# Patient Record
Sex: MALE | Race: WHITE
[De-identification: names, ages, dates, MRNs, and addresses within clinical notes are randomized per-mention and may not be internally consistent; named-entity substitution may affect disease eponyms.]

---

## 2018-03-27 ENCOUNTER — APPOINTMENT (OUTPATIENT)
Dept: NEUROLOGY | Facility: CLINIC | Age: 58
End: 2018-03-27
Payer: COMMERCIAL

## 2018-03-27 VITALS
WEIGHT: 180 LBS | DIASTOLIC BLOOD PRESSURE: 84 MMHG | SYSTOLIC BLOOD PRESSURE: 144 MMHG | HEIGHT: 72 IN | HEART RATE: 70 BPM | BODY MASS INDEX: 24.38 KG/M2
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
## 2018-03-27 PROCEDURE — 99204 OFFICE O/P NEW MOD 45 MIN: CPT

## 2018-11-14 ENCOUNTER — EMERGENCY (EMERGENCY)
Facility: HOSPITAL | Age: 58
LOS: 1 days | Discharge: HOME | End: 2018-11-14
Attending: EMERGENCY MEDICINE | Admitting: EMERGENCY MEDICINE
Payer: COMMERCIAL

## 2018-11-14 VITALS
SYSTOLIC BLOOD PRESSURE: 199 MMHG | WEIGHT: 179.9 LBS | HEIGHT: 72 IN | RESPIRATION RATE: 18 BRPM | HEART RATE: 79 BPM | TEMPERATURE: 97 F | OXYGEN SATURATION: 96 % | DIASTOLIC BLOOD PRESSURE: 97 MMHG

## 2018-11-14 VITALS
HEART RATE: 72 BPM | OXYGEN SATURATION: 99 % | RESPIRATION RATE: 16 BRPM | SYSTOLIC BLOOD PRESSURE: 160 MMHG | DIASTOLIC BLOOD PRESSURE: 90 MMHG

## 2018-11-14 PROBLEM — Z00.00 ENCOUNTER FOR PREVENTIVE HEALTH EXAMINATION: Noted: 2018-11-14

## 2018-11-14 LAB
ALBUMIN SERPL ELPH-MCNC: 3.8 G/DL — SIGNIFICANT CHANGE UP (ref 3.3–5)
ALP SERPL-CCNC: 86 U/L — SIGNIFICANT CHANGE UP (ref 40–120)
ALT FLD-CCNC: 31 U/L DA — SIGNIFICANT CHANGE UP (ref 10–45)
ANION GAP SERPL CALC-SCNC: 5 MMOL/L — SIGNIFICANT CHANGE UP (ref 5–17)
APTT BLD: 31.7 SEC — SIGNIFICANT CHANGE UP (ref 27.5–36.3)
AST SERPL-CCNC: 18 U/L — SIGNIFICANT CHANGE UP (ref 10–40)
BASOPHILS # BLD AUTO: 0.1 K/UL — SIGNIFICANT CHANGE UP (ref 0–0.2)
BASOPHILS NFR BLD AUTO: 0.8 % — SIGNIFICANT CHANGE UP (ref 0–2)
BILIRUB SERPL-MCNC: 0.6 MG/DL — SIGNIFICANT CHANGE UP (ref 0.2–1.2)
BLD GP AB SCN SERPL QL: SIGNIFICANT CHANGE UP
BUN SERPL-MCNC: 13 MG/DL — SIGNIFICANT CHANGE UP (ref 7–23)
CALCIUM SERPL-MCNC: 8.8 MG/DL — SIGNIFICANT CHANGE UP (ref 8.4–10.5)
CHLORIDE SERPL-SCNC: 103 MMOL/L — SIGNIFICANT CHANGE UP (ref 96–108)
CK MB BLD-MCNC: 2.2 % — SIGNIFICANT CHANGE UP (ref 0–3.5)
CK MB CFR SERPL CALC: 2.9 NG/ML — SIGNIFICANT CHANGE UP (ref 0.5–10)
CK SERPL-CCNC: 129 U/L — SIGNIFICANT CHANGE UP (ref 30–200)
CO2 SERPL-SCNC: 31 MMOL/L — SIGNIFICANT CHANGE UP (ref 22–31)
CREAT SERPL-MCNC: 0.84 MG/DL — SIGNIFICANT CHANGE UP (ref 0.5–1.3)
EOSINOPHIL # BLD AUTO: 0 K/UL — SIGNIFICANT CHANGE UP (ref 0–0.5)
EOSINOPHIL NFR BLD AUTO: 0.3 % — SIGNIFICANT CHANGE UP (ref 0–6)
GLUCOSE SERPL-MCNC: 99 MG/DL — SIGNIFICANT CHANGE UP (ref 70–99)
HCT VFR BLD CALC: 43 % — SIGNIFICANT CHANGE UP (ref 39–50)
HGB BLD-MCNC: 14.6 G/DL — SIGNIFICANT CHANGE UP (ref 13–17)
INR BLD: 0.97 RATIO — SIGNIFICANT CHANGE UP (ref 0.88–1.16)
LYMPHOCYTES # BLD AUTO: 1.6 K/UL — SIGNIFICANT CHANGE UP (ref 1–3.3)
LYMPHOCYTES # BLD AUTO: 22.8 % — SIGNIFICANT CHANGE UP (ref 13–44)
MCHC RBC-ENTMCNC: 32.1 PG — SIGNIFICANT CHANGE UP (ref 27–34)
MCHC RBC-ENTMCNC: 33.9 GM/DL — SIGNIFICANT CHANGE UP (ref 32–36)
MCV RBC AUTO: 94.9 FL — SIGNIFICANT CHANGE UP (ref 80–100)
MONOCYTES # BLD AUTO: 0.5 K/UL — SIGNIFICANT CHANGE UP (ref 0–0.9)
MONOCYTES NFR BLD AUTO: 7.1 % — SIGNIFICANT CHANGE UP (ref 1–9)
NEUTROPHILS # BLD AUTO: 4.8 K/UL — SIGNIFICANT CHANGE UP (ref 1.8–7.4)
NEUTROPHILS NFR BLD AUTO: 68.9 % — SIGNIFICANT CHANGE UP (ref 43–77)
PLATELET # BLD AUTO: 198 K/UL — SIGNIFICANT CHANGE UP (ref 150–400)
POTASSIUM SERPL-MCNC: 3.8 MMOL/L — SIGNIFICANT CHANGE UP (ref 3.5–5.3)
POTASSIUM SERPL-SCNC: 3.8 MMOL/L — SIGNIFICANT CHANGE UP (ref 3.5–5.3)
PROT SERPL-MCNC: 6.7 G/DL — SIGNIFICANT CHANGE UP (ref 6–8.3)
PROTHROM AB SERPL-ACNC: 10.9 SEC — SIGNIFICANT CHANGE UP (ref 10–12.9)
RBC # BLD: 4.53 M/UL — SIGNIFICANT CHANGE UP (ref 4.2–5.8)
RBC # FLD: 11.8 % — SIGNIFICANT CHANGE UP (ref 10.3–14.5)
SODIUM SERPL-SCNC: 139 MMOL/L — SIGNIFICANT CHANGE UP (ref 135–145)
TROPONIN I SERPL-MCNC: <.017 NG/ML — LOW (ref 0.02–0.06)
WBC # BLD: 6.9 K/UL — SIGNIFICANT CHANGE UP (ref 3.8–10.5)
WBC # FLD AUTO: 6.9 K/UL — SIGNIFICANT CHANGE UP (ref 3.8–10.5)

## 2018-11-14 PROCEDURE — 86850 RBC ANTIBODY SCREEN: CPT

## 2018-11-14 PROCEDURE — 70450 CT HEAD/BRAIN W/O DYE: CPT

## 2018-11-14 PROCEDURE — 80053 COMPREHEN METABOLIC PANEL: CPT

## 2018-11-14 PROCEDURE — 86901 BLOOD TYPING SEROLOGIC RH(D): CPT

## 2018-11-14 PROCEDURE — 70450 CT HEAD/BRAIN W/O DYE: CPT | Mod: 26,59

## 2018-11-14 PROCEDURE — 99284 EMERGENCY DEPT VISIT MOD MDM: CPT | Mod: 25

## 2018-11-14 PROCEDURE — 71045 X-RAY EXAM CHEST 1 VIEW: CPT | Mod: 26

## 2018-11-14 PROCEDURE — 85027 COMPLETE CBC AUTOMATED: CPT

## 2018-11-14 PROCEDURE — 99285 EMERGENCY DEPT VISIT HI MDM: CPT

## 2018-11-14 PROCEDURE — 84484 ASSAY OF TROPONIN QUANT: CPT

## 2018-11-14 PROCEDURE — 82550 ASSAY OF CK (CPK): CPT

## 2018-11-14 PROCEDURE — 86900 BLOOD TYPING SEROLOGIC ABO: CPT

## 2018-11-14 PROCEDURE — 82553 CREATINE MB FRACTION: CPT

## 2018-11-14 PROCEDURE — 93010 ELECTROCARDIOGRAM REPORT: CPT

## 2018-11-14 PROCEDURE — 82962 GLUCOSE BLOOD TEST: CPT

## 2018-11-14 PROCEDURE — 93005 ELECTROCARDIOGRAM TRACING: CPT

## 2018-11-14 PROCEDURE — 70496 CT ANGIOGRAPHY HEAD: CPT

## 2018-11-14 PROCEDURE — 70498 CT ANGIOGRAPHY NECK: CPT | Mod: 26

## 2018-11-14 PROCEDURE — 85610 PROTHROMBIN TIME: CPT

## 2018-11-14 PROCEDURE — 85730 THROMBOPLASTIN TIME PARTIAL: CPT

## 2018-11-14 PROCEDURE — 71045 X-RAY EXAM CHEST 1 VIEW: CPT

## 2018-11-14 PROCEDURE — 70498 CT ANGIOGRAPHY NECK: CPT

## 2018-11-14 PROCEDURE — 70496 CT ANGIOGRAPHY HEAD: CPT | Mod: 26

## 2018-11-14 NOTE — ED PROVIDER NOTE - NSFOLLOWUPINSTRUCTIONS_ED_ALL_ED_FT
1. Follow up with your primary care doctor or Dr. Reyes in 1-2 days  2. Follow up with Ophthalmology in 1-2 days  3. Return to the ED for worsening vision changes, any weakness or numbness in arms or legs, chest pain or shortness of breath or any concerns

## 2018-11-14 NOTE — ED ADULT NURSE NOTE - NSIMPLEMENTINTERV_GEN_ALL_ED
Implemented All Universal Safety Interventions:  [place] to call system. Call bell, personal items and telephone within reach. Instruct patient to call for assistance. Room bathroom lighting operational. Non-slip footwear when patient is off stretcher. Physically safe environment: no spills, clutter or unnecessary equipment. Stretcher in lowest position, wheels locked, appropriate side rails in place.

## 2018-11-14 NOTE — ED PROVIDER NOTE - PROGRESS NOTE DETAILS
left message for Dr. Barber PMD -[id number] D/w Dr. Qui (telestroke neuro) no tPA Dr. Bazan: Pt's sx and HTN improving without intervention, will dc home with ophtho and pmd f/u Dr. Bazan: Pt's sx and HTN improving without intervention, will dc home with ophtho and pmd f/u. Vision is back to normal.

## 2018-11-14 NOTE — ED PROVIDER NOTE - OBJECTIVE STATEMENT
57 y/o M pmh: right labral tear, disc compression lumber spine pw acute onset of bilateral hemianopia and "vice like" headache that began at 11:15am today. Pt has been seen for hypertension in the last few weeks but not treated for it.  Denies SOB, CP, fever, vomiting, weakness, ataxia.

## 2018-11-14 NOTE — ED PROVIDER NOTE - CARE PROVIDER_API CALL
Reyes, John A (MD), Internal Medicine  [address] [facility]  [address]  [place] [address]  Phone: [phone]  Fax: [phone]    José Helm), Ophthalmology  [address] [place] [id number]  Phone: [phone]  Fax: [phone]

## 2018-11-14 NOTE — ED PROVIDER NOTE - CARE PLAN
Principal Discharge DX:	HTN (hypertension) Principal Discharge DX:	HTN (hypertension)  Secondary Diagnosis:	Vision changes

## 2018-11-14 NOTE — ED PROVIDER NOTE - MEDICAL DECISION MAKING DETAILS
59 y/o R/O CVA CT/CTA scan, labs, ekg, chest xray, stroke protocol. Reassess 59 y/o R/O CVA CT/CTA scan, labs, ekg, chest xray, stroke protocol. Reassess. Sx likely secondary to HTN.

## 2018-11-14 NOTE — ED ADULT NURSE NOTE - OBJECTIVE STATEMENT
Patient to ED presents with severe headache and "floaters" within field of vision. Patient hypertensive, states he was told by his PMD that he was hypertensive and he was scheduled for follow up appointment but did not go to appointment after waiting two hours to be seen. Patient strength is equal bilaterally, upper extremity peripheral sensation is intact per my assessment, facial sensation in tact and facial alignment symmetrical. Patient to ED presents with severe headache and "floaters" within field of vision. Patient hypertensive, states he was told by care provider prior to a procedure for his back three weeks ago that he was hypertensive and was recommended for follow up appointment but did not go to appointment after waiting two hours to be seen. Patient strength is equal bilaterally, upper extremity peripheral sensation is intact per my assessment, facial sensation in tact and facial alignment symmetrical.

## 2018-11-15 ENCOUNTER — APPOINTMENT (OUTPATIENT)
Dept: FAMILY MEDICINE | Facility: CLINIC | Age: 58
End: 2018-11-15
Payer: COMMERCIAL

## 2018-11-15 VITALS
SYSTOLIC BLOOD PRESSURE: 138 MMHG | DIASTOLIC BLOOD PRESSURE: 88 MMHG | WEIGHT: 175 LBS | BODY MASS INDEX: 23.7 KG/M2 | HEIGHT: 72 IN

## 2018-11-15 DIAGNOSIS — I10 ESSENTIAL (PRIMARY) HYPERTENSION: ICD-10-CM

## 2018-11-15 DIAGNOSIS — G89.29 LUMBAGO WITH SCIATICA, RIGHT SIDE: ICD-10-CM

## 2018-11-15 DIAGNOSIS — M54.16 RADICULOPATHY, LUMBAR REGION: ICD-10-CM

## 2018-11-15 DIAGNOSIS — Z02.82 ENCOUNTER FOR ADOPTION SERVICES: ICD-10-CM

## 2018-11-15 DIAGNOSIS — M54.41 LUMBAGO WITH SCIATICA, RIGHT SIDE: ICD-10-CM

## 2018-11-15 DIAGNOSIS — Z23 ENCOUNTER FOR IMMUNIZATION: ICD-10-CM

## 2018-11-15 DIAGNOSIS — F41.9 ANXIETY DISORDER, UNSPECIFIED: ICD-10-CM

## 2018-11-15 DIAGNOSIS — F32.9 ANXIETY DISORDER, UNSPECIFIED: ICD-10-CM

## 2018-11-15 PROCEDURE — 99204 OFFICE O/P NEW MOD 45 MIN: CPT | Mod: 25

## 2018-11-15 PROCEDURE — G0008: CPT

## 2018-11-15 PROCEDURE — 90686 IIV4 VACC NO PRSV 0.5 ML IM: CPT

## 2018-11-15 NOTE — HISTORY OF PRESENT ILLNESS
[FreeTextEntry8] : New pt. Went to [facility] ED yesterday due to blurry vision, head aches. Pt. BP was 180/108. Treated in ED but did not give Rx.  Here today to discuss BP.\par PCN allergy.\par Also wants a flu vaccine.

## 2018-11-15 NOTE — HEALTH RISK ASSESSMENT
[No falls in past year] : Patient reported no falls in the past year [0] : 2) Feeling down, depressed, or hopeless: Not at all (0) [Patient reported colonoscopy was normal] : Patient reported colonoscopy was normal [] : No [[id number]] : 0 [ColonoscopyDate] : 01/15

## 2018-11-15 NOTE — PLAN
[FreeTextEntry1] : Strict low sodium diet and increase exercise. Recheck BP in 2-3 weeks and CPE. IF BP still high will start antihypertensive agent.\par Research last Tetanus vaccine as well.

## 2018-11-15 NOTE — COUNSELING
[Healthy eating counseling provided] : healthy eating [Activity counseling provided] : activity [Behavioral health counseling provided] : behavioral health  [Low Fat Diet] : Low fat diet [Low Salt Diet] : Low salt diet [Walking] : Walking [None] : None [Good understanding] : Patient has a good understanding of lifestyle changes and the steps needed to achieve self management goals

## 2018-12-06 ENCOUNTER — APPOINTMENT (OUTPATIENT)
Dept: FAMILY MEDICINE | Facility: CLINIC | Age: 58
End: 2018-12-06

## 2020-11-03 ENCOUNTER — EMERGENCY (EMERGENCY)
Facility: HOSPITAL | Age: 60
LOS: 0 days | Discharge: HOME | End: 2020-11-03
Attending: EMERGENCY MEDICINE
Payer: COMMERCIAL

## 2020-11-03 VITALS
OXYGEN SATURATION: 100 % | SYSTOLIC BLOOD PRESSURE: 159 MMHG | DIASTOLIC BLOOD PRESSURE: 80 MMHG | RESPIRATION RATE: 18 BRPM | HEART RATE: 62 BPM

## 2020-11-03 VITALS
TEMPERATURE: 98 F | DIASTOLIC BLOOD PRESSURE: 92 MMHG | WEIGHT: 169.98 LBS | HEIGHT: 72 IN | SYSTOLIC BLOOD PRESSURE: 169 MMHG | RESPIRATION RATE: 15 BRPM | HEART RATE: 55 BPM | OXYGEN SATURATION: 99 %

## 2020-11-03 DIAGNOSIS — Z79.899 OTHER LONG TERM (CURRENT) DRUG THERAPY: ICD-10-CM

## 2020-11-03 DIAGNOSIS — I10 ESSENTIAL (PRIMARY) HYPERTENSION: ICD-10-CM

## 2020-11-03 DIAGNOSIS — Z88.0 ALLERGY STATUS TO PENICILLIN: ICD-10-CM

## 2020-11-03 DIAGNOSIS — R47.89 OTHER SPEECH DISTURBANCES: ICD-10-CM

## 2020-11-03 DIAGNOSIS — R51.9 HEADACHE, UNSPECIFIED: ICD-10-CM

## 2020-11-03 DIAGNOSIS — F12.90 CANNABIS USE, UNSPECIFIED, UNCOMPLICATED: ICD-10-CM

## 2020-11-03 DIAGNOSIS — R00.1 BRADYCARDIA, UNSPECIFIED: ICD-10-CM

## 2020-11-03 LAB
ALBUMIN SERPL ELPH-MCNC: 4.4 G/DL — SIGNIFICANT CHANGE UP (ref 3.3–5)
ALP SERPL-CCNC: 102 U/L — SIGNIFICANT CHANGE UP (ref 40–120)
ALT FLD-CCNC: 35 U/L — SIGNIFICANT CHANGE UP (ref 12–78)
ANION GAP SERPL CALC-SCNC: 5 MMOL/L — SIGNIFICANT CHANGE UP (ref 5–17)
APTT BLD: 34.9 SEC — SIGNIFICANT CHANGE UP (ref 27.5–35.5)
AST SERPL-CCNC: 23 U/L — SIGNIFICANT CHANGE UP (ref 15–37)
BASOPHILS # BLD AUTO: 0.03 K/UL — SIGNIFICANT CHANGE UP (ref 0–0.2)
BASOPHILS NFR BLD AUTO: 0.5 % — SIGNIFICANT CHANGE UP (ref 0–2)
BILIRUB SERPL-MCNC: 0.5 MG/DL — SIGNIFICANT CHANGE UP (ref 0.2–1.2)
BUN SERPL-MCNC: 14 MG/DL — SIGNIFICANT CHANGE UP (ref 7–23)
CALCIUM SERPL-MCNC: 9.2 MG/DL — SIGNIFICANT CHANGE UP (ref 8.5–10.1)
CHLORIDE SERPL-SCNC: 106 MMOL/L — SIGNIFICANT CHANGE UP (ref 96–108)
CO2 SERPL-SCNC: 28 MMOL/L — SIGNIFICANT CHANGE UP (ref 22–31)
CREAT SERPL-MCNC: 0.89 MG/DL — SIGNIFICANT CHANGE UP (ref 0.5–1.3)
EOSINOPHIL # BLD AUTO: 0.08 K/UL — SIGNIFICANT CHANGE UP (ref 0–0.5)
EOSINOPHIL NFR BLD AUTO: 1.2 % — SIGNIFICANT CHANGE UP (ref 0–6)
GLUCOSE SERPL-MCNC: 81 MG/DL — SIGNIFICANT CHANGE UP (ref 70–99)
HCT VFR BLD CALC: 44.9 % — SIGNIFICANT CHANGE UP (ref 39–50)
HGB BLD-MCNC: 15.1 G/DL — SIGNIFICANT CHANGE UP (ref 13–17)
IMM GRANULOCYTES NFR BLD AUTO: 0.2 % — SIGNIFICANT CHANGE UP (ref 0–1.5)
INR BLD: 0.98 RATIO — SIGNIFICANT CHANGE UP (ref 0.88–1.16)
LYMPHOCYTES # BLD AUTO: 2.14 K/UL — SIGNIFICANT CHANGE UP (ref 1–3.3)
LYMPHOCYTES # BLD AUTO: 32.3 % — SIGNIFICANT CHANGE UP (ref 13–44)
MCHC RBC-ENTMCNC: 31.2 PG — SIGNIFICANT CHANGE UP (ref 27–34)
MCHC RBC-ENTMCNC: 33.6 GM/DL — SIGNIFICANT CHANGE UP (ref 32–36)
MCV RBC AUTO: 92.8 FL — SIGNIFICANT CHANGE UP (ref 80–100)
MONOCYTES # BLD AUTO: 0.53 K/UL — SIGNIFICANT CHANGE UP (ref 0–0.9)
MONOCYTES NFR BLD AUTO: 8 % — SIGNIFICANT CHANGE UP (ref 2–14)
NEUTROPHILS # BLD AUTO: 3.84 K/UL — SIGNIFICANT CHANGE UP (ref 1.8–7.4)
NEUTROPHILS NFR BLD AUTO: 57.8 % — SIGNIFICANT CHANGE UP (ref 43–77)
PLATELET # BLD AUTO: 223 K/UL — SIGNIFICANT CHANGE UP (ref 150–400)
POTASSIUM SERPL-MCNC: 3.9 MMOL/L — SIGNIFICANT CHANGE UP (ref 3.5–5.3)
POTASSIUM SERPL-SCNC: 3.9 MMOL/L — SIGNIFICANT CHANGE UP (ref 3.5–5.3)
PROT SERPL-MCNC: 8 GM/DL — SIGNIFICANT CHANGE UP (ref 6–8.3)
PROTHROM AB SERPL-ACNC: 11.5 SEC — SIGNIFICANT CHANGE UP (ref 10.6–13.6)
RBC # BLD: 4.84 M/UL — SIGNIFICANT CHANGE UP (ref 4.2–5.8)
RBC # FLD: 12.2 % — SIGNIFICANT CHANGE UP (ref 10.3–14.5)
SODIUM SERPL-SCNC: 139 MMOL/L — SIGNIFICANT CHANGE UP (ref 135–145)
TROPONIN I SERPL-MCNC: <0.015 NG/ML — SIGNIFICANT CHANGE UP (ref 0.01–0.04)
WBC # BLD: 6.63 K/UL — SIGNIFICANT CHANGE UP (ref 3.8–10.5)
WBC # FLD AUTO: 6.63 K/UL — SIGNIFICANT CHANGE UP (ref 3.8–10.5)

## 2020-11-03 PROCEDURE — 85610 PROTHROMBIN TIME: CPT

## 2020-11-03 PROCEDURE — 99284 EMERGENCY DEPT VISIT MOD MDM: CPT | Mod: 25

## 2020-11-03 PROCEDURE — 36415 COLL VENOUS BLD VENIPUNCTURE: CPT

## 2020-11-03 PROCEDURE — 80053 COMPREHEN METABOLIC PANEL: CPT

## 2020-11-03 PROCEDURE — 85025 COMPLETE CBC W/AUTO DIFF WBC: CPT

## 2020-11-03 PROCEDURE — 93010 ELECTROCARDIOGRAM REPORT: CPT

## 2020-11-03 PROCEDURE — 85730 THROMBOPLASTIN TIME PARTIAL: CPT

## 2020-11-03 PROCEDURE — 99284 EMERGENCY DEPT VISIT MOD MDM: CPT

## 2020-11-03 PROCEDURE — 84484 ASSAY OF TROPONIN QUANT: CPT

## 2020-11-03 PROCEDURE — 93005 ELECTROCARDIOGRAM TRACING: CPT

## 2020-11-03 PROCEDURE — 70450 CT HEAD/BRAIN W/O DYE: CPT | Mod: 26

## 2020-11-03 PROCEDURE — 70450 CT HEAD/BRAIN W/O DYE: CPT

## 2020-11-03 RX ORDER — ALPRAZOLAM 0.25 MG
0.5 TABLET ORAL ONCE
Refills: 0 | Status: DISCONTINUED | OUTPATIENT
Start: 2020-11-03 | End: 2020-11-03

## 2020-11-03 RX ORDER — METOPROLOL TARTRATE 50 MG
25 TABLET ORAL ONCE
Refills: 0 | Status: COMPLETED | OUTPATIENT
Start: 2020-11-03 | End: 2020-11-03

## 2020-11-03 RX ADMIN — Medication 25 MILLIGRAM(S): at 15:31

## 2020-11-03 RX ADMIN — Medication 0.5 MILLIGRAM(S): at 15:31

## 2020-11-03 NOTE — ED ADULT TRIAGE NOTE - CHIEF COMPLAINT QUOTE
Patient presents in ED with complaints of hypertension. Patient states " I took it at home and it was high and I was concerned". Patient denies headache in triage.

## 2020-11-03 NOTE — ED STATDOCS - CLINICAL SUMMARY MEDICAL DECISION MAKING FREE TEXT BOX
Transient word finding difficulty from 12:30 to 12:45. Now here due to elevated BP. BP currently 160s/90s, baseline 140s/80s. Pt under a lot of stress. NIH 0. Will obtain CT head, screening labs. Symptoms unlikely related to TIA but advise outpatient follow up. Transient word finding difficulty from 12:30 to 12:45. Now here due to elevated BP. BP currently 160s/90s, baseline 140s/80s. Pt under a lot of stress. NIH 0. Will obtain CT head given HA, screening labs. Symptoms unlikely related to TIA but advise outpatient neuro follow up.

## 2020-11-03 NOTE — ED STATDOCS - ATTENDING CONTRIBUTION TO CARE
I, Guzman Randolph MD,  performed the initial face to face bedside interview with this patient regarding history of present illness, review of symptoms and relevant past medical, social and family history.  I completed an independent physical examination.  I was the initial provider who evaluated this patient. I have signed out the follow up of any pending tests (i.e. labs, radiological studies) to the ACP.  The ACP will complete the work up, interpret tests, administer medications if necessary and reassess the patient for an appropriate disposition.  If questions arise the ACP is expected to contact me for consultation. In the current work-flow I usually don't get to speak to nor reassess patients for final disposition once I sign the case out to the ACP (Unless otherwise documented).

## 2020-11-03 NOTE — ED STATDOCS - CARE PLAN
Principal Discharge DX:	Elevated blood pressure reading   Principal Discharge DX:	Elevated blood pressure reading  Secondary Diagnosis:	Headache  Secondary Diagnosis:	Word finding difficulty

## 2020-11-03 NOTE — ED STATDOCS - PATIENT PORTAL LINK FT
You can access the FollowMyHealth Patient Portal offered by [facility] by registering at the following website: [email or web address]. By joining [employer]’s FollowMyHealth portal, you will also be able to view your health information using other applications (apps) compatible with our system.

## 2020-11-03 NOTE — ED STATDOCS - PHYSICAL EXAMINATION
*GEN: No acute distress, well appearing   *HEAD: Normocephalic, Atraumatic  *EYES/NOSE: b/l Pupils symmetric & Reactive to ligth, EOMI b/l  *THROAT: airway patent, moist mucous membranes  *NECK: Neck supple  *PULMONARY: No Respiratory distress, symmetric b/l chest rise  *CARDIAC: s1s2, regular rhythm   *ABDOMEN:  Non Tender, Non Distended, soft, no guarding, no rebound, no masses   *BACK: no CVA tenderness, No midline vertebral tenderness to palpation   *EXTREMITIES: symmetric pulses, 2+ DP & radial pulses, no cyanosis, no edema   *SKIN: no rash, no bruising   *NEUROLOGIC: CN 2-12 intact, normal finger to nose & heel to shin; no dysdiadochokinesis; equal & normal strength & sensation in b/l UE/LE; full active & passive ROM in all extremeties,  no pronator drift, normal patellar reflex, normal gait, romberg sign negative   *PSYCH: appropriate concern about symptoms, pleasant

## 2020-11-03 NOTE — ED STATDOCS - OBJECTIVE STATEMENT
Pertinent HPI/PMH/PSH/FHx/SHx and Review of Systems contained within  HPI: 59 y/o male p/w HTN. 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 Pt reports he took his BP, resulting 177/92. Pt called his cardiologist who told him to repeat his BP, resulting 200/97. +HA, 3/10. Baseline BP 140s/80s. PCP: Dr. Barber. Cardio: Dr. Basurto.  PMH/PSH relevant for: HTN on Metoprolol and Valsartan   ROS negative for: fever, Chest pain, SOB, Nausea, vomiting, diarrhea, abdominal pain, dysuria, weakness, numbness, tingling   SocialHx: Nonsmoker. Occasional Marijuana user.

## 2020-11-03 NOTE — ED STATDOCS - PROGRESS NOTE DETAILS
Patient seen and evaluated with ED attending at intake initially.  Appears anxious, elevated BP, slight HA.  At this time he is feeling better, BP improved.  He is going to follow up with his cardiologist.  Return precautions reviewed -Thang Ward PA-C

## 2020-11-03 NOTE — ED STATDOCS - NS ED ROS FT
Review of Systems:  	•	CONSTITUTIONAL: no fever  	•	SKIN: no rash  	•	RESPIRATORY: no shortness of breath  	•	CARDIAC: no chest pain  	•	GI:  no abd pain, no nausea, no vomiting, no diarrhea  	•	GENITO-URINARY:  no dysuria  	•	MUSCULOSKELETAL:  no back pain  	•	NEUROLOGIC: no weakness. +word finding difficulty, +HA   	•	ALLERGY: no rhinorrhea  	•	PSYSCHIATRIC: appropriate concern about symptoms

## 2021-01-21 ENCOUNTER — EMERGENCY (EMERGENCY)
Facility: HOSPITAL | Age: 61
LOS: 0 days | Discharge: HOME | End: 2021-01-21
Attending: EMERGENCY MEDICINE
Payer: COMMERCIAL

## 2021-01-21 VITALS — HEIGHT: 72 IN | WEIGHT: 169.98 LBS

## 2021-01-21 VITALS
SYSTOLIC BLOOD PRESSURE: 150 MMHG | OXYGEN SATURATION: 96 % | TEMPERATURE: 98 F | RESPIRATION RATE: 16 BRPM | DIASTOLIC BLOOD PRESSURE: 81 MMHG | HEART RATE: 80 BPM

## 2021-01-21 DIAGNOSIS — Z88.0 ALLERGY STATUS TO PENICILLIN: ICD-10-CM

## 2021-01-21 DIAGNOSIS — R10.30 LOWER ABDOMINAL PAIN, UNSPECIFIED: ICD-10-CM

## 2021-01-21 DIAGNOSIS — I10 ESSENTIAL (PRIMARY) HYPERTENSION: ICD-10-CM

## 2021-01-21 DIAGNOSIS — Z79.52 LONG TERM (CURRENT) USE OF SYSTEMIC STEROIDS: ICD-10-CM

## 2021-01-21 DIAGNOSIS — Z79.84 LONG TERM (CURRENT) USE OF ORAL HYPOGLYCEMIC DRUGS: ICD-10-CM

## 2021-01-21 DIAGNOSIS — Z87.19 PERSONAL HISTORY OF OTHER DISEASES OF THE DIGESTIVE SYSTEM: ICD-10-CM

## 2021-01-21 DIAGNOSIS — K59.00 CONSTIPATION, UNSPECIFIED: ICD-10-CM

## 2021-01-21 LAB
ALBUMIN SERPL ELPH-MCNC: 4.1 G/DL — SIGNIFICANT CHANGE UP (ref 3.3–5)
ALP SERPL-CCNC: 85 U/L — SIGNIFICANT CHANGE UP (ref 40–120)
ALT FLD-CCNC: 26 U/L — SIGNIFICANT CHANGE UP (ref 12–78)
ANION GAP SERPL CALC-SCNC: 6 MMOL/L — SIGNIFICANT CHANGE UP (ref 5–17)
APTT BLD: 37.1 SEC — HIGH (ref 27.5–35.5)
AST SERPL-CCNC: 18 U/L — SIGNIFICANT CHANGE UP (ref 15–37)
BASOPHILS # BLD AUTO: 0.04 K/UL — SIGNIFICANT CHANGE UP (ref 0–0.2)
BASOPHILS NFR BLD AUTO: 0.7 % — SIGNIFICANT CHANGE UP (ref 0–2)
BILIRUB SERPL-MCNC: 0.6 MG/DL — SIGNIFICANT CHANGE UP (ref 0.2–1.2)
BUN SERPL-MCNC: 9 MG/DL — SIGNIFICANT CHANGE UP (ref 7–23)
CALCIUM SERPL-MCNC: 9.4 MG/DL — SIGNIFICANT CHANGE UP (ref 8.5–10.1)
CHLORIDE SERPL-SCNC: 107 MMOL/L — SIGNIFICANT CHANGE UP (ref 96–108)
CO2 SERPL-SCNC: 27 MMOL/L — SIGNIFICANT CHANGE UP (ref 22–31)
CREAT SERPL-MCNC: 0.72 MG/DL — SIGNIFICANT CHANGE UP (ref 0.5–1.3)
EOSINOPHIL # BLD AUTO: 0.03 K/UL — SIGNIFICANT CHANGE UP (ref 0–0.5)
EOSINOPHIL NFR BLD AUTO: 0.5 % — SIGNIFICANT CHANGE UP (ref 0–6)
GLUCOSE SERPL-MCNC: 84 MG/DL — SIGNIFICANT CHANGE UP (ref 70–99)
HCT VFR BLD CALC: 40.1 % — SIGNIFICANT CHANGE UP (ref 39–50)
HGB BLD-MCNC: 13.8 G/DL — SIGNIFICANT CHANGE UP (ref 13–17)
IMM GRANULOCYTES NFR BLD AUTO: 0.2 % — SIGNIFICANT CHANGE UP (ref 0–1.5)
INR BLD: 1.09 RATIO — SIGNIFICANT CHANGE UP (ref 0.88–1.16)
LACTATE SERPL-SCNC: 0.9 MMOL/L — SIGNIFICANT CHANGE UP (ref 0.7–2)
LIDOCAIN IGE QN: 91 U/L — SIGNIFICANT CHANGE UP (ref 73–393)
LYMPHOCYTES # BLD AUTO: 1.65 K/UL — SIGNIFICANT CHANGE UP (ref 1–3.3)
LYMPHOCYTES # BLD AUTO: 28.6 % — SIGNIFICANT CHANGE UP (ref 13–44)
MCHC RBC-ENTMCNC: 31.8 PG — SIGNIFICANT CHANGE UP (ref 27–34)
MCHC RBC-ENTMCNC: 34.4 GM/DL — SIGNIFICANT CHANGE UP (ref 32–36)
MCV RBC AUTO: 92.4 FL — SIGNIFICANT CHANGE UP (ref 80–100)
MONOCYTES # BLD AUTO: 0.41 K/UL — SIGNIFICANT CHANGE UP (ref 0–0.9)
MONOCYTES NFR BLD AUTO: 7.1 % — SIGNIFICANT CHANGE UP (ref 2–14)
NEUTROPHILS # BLD AUTO: 3.62 K/UL — SIGNIFICANT CHANGE UP (ref 1.8–7.4)
NEUTROPHILS NFR BLD AUTO: 62.9 % — SIGNIFICANT CHANGE UP (ref 43–77)
PLATELET # BLD AUTO: 230 K/UL — SIGNIFICANT CHANGE UP (ref 150–400)
POTASSIUM SERPL-MCNC: 3.5 MMOL/L — SIGNIFICANT CHANGE UP (ref 3.5–5.3)
POTASSIUM SERPL-SCNC: 3.5 MMOL/L — SIGNIFICANT CHANGE UP (ref 3.5–5.3)
PROT SERPL-MCNC: 7 GM/DL — SIGNIFICANT CHANGE UP (ref 6–8.3)
PROTHROM AB SERPL-ACNC: 12.7 SEC — SIGNIFICANT CHANGE UP (ref 10.6–13.6)
RBC # BLD: 4.34 M/UL — SIGNIFICANT CHANGE UP (ref 4.2–5.8)
RBC # FLD: 12.1 % — SIGNIFICANT CHANGE UP (ref 10.3–14.5)
SARS-COV-2 RNA SPEC QL NAA+PROBE: SIGNIFICANT CHANGE UP
SODIUM SERPL-SCNC: 140 MMOL/L — SIGNIFICANT CHANGE UP (ref 135–145)
WBC # BLD: 5.76 K/UL — SIGNIFICANT CHANGE UP (ref 3.8–10.5)
WBC # FLD AUTO: 5.76 K/UL — SIGNIFICANT CHANGE UP (ref 3.8–10.5)

## 2021-01-21 PROCEDURE — 86900 BLOOD TYPING SEROLOGIC ABO: CPT

## 2021-01-21 PROCEDURE — U0003: CPT

## 2021-01-21 PROCEDURE — 85610 PROTHROMBIN TIME: CPT

## 2021-01-21 PROCEDURE — 83690 ASSAY OF LIPASE: CPT

## 2021-01-21 PROCEDURE — 99284 EMERGENCY DEPT VISIT MOD MDM: CPT

## 2021-01-21 PROCEDURE — 85025 COMPLETE CBC W/AUTO DIFF WBC: CPT

## 2021-01-21 PROCEDURE — 74177 CT ABD & PELVIS W/CONTRAST: CPT | Mod: 26

## 2021-01-21 PROCEDURE — 36415 COLL VENOUS BLD VENIPUNCTURE: CPT

## 2021-01-21 PROCEDURE — 83605 ASSAY OF LACTIC ACID: CPT

## 2021-01-21 PROCEDURE — 86850 RBC ANTIBODY SCREEN: CPT

## 2021-01-21 PROCEDURE — 74177 CT ABD & PELVIS W/CONTRAST: CPT

## 2021-01-21 PROCEDURE — 93005 ELECTROCARDIOGRAM TRACING: CPT

## 2021-01-21 PROCEDURE — U0005: CPT

## 2021-01-21 PROCEDURE — 80053 COMPREHEN METABOLIC PANEL: CPT

## 2021-01-21 PROCEDURE — 85730 THROMBOPLASTIN TIME PARTIAL: CPT

## 2021-01-21 PROCEDURE — 96374 THER/PROPH/DIAG INJ IV PUSH: CPT | Mod: XU

## 2021-01-21 PROCEDURE — 99284 EMERGENCY DEPT VISIT MOD MDM: CPT | Mod: 25

## 2021-01-21 PROCEDURE — 93010 ELECTROCARDIOGRAM REPORT: CPT

## 2021-01-21 PROCEDURE — 86901 BLOOD TYPING SEROLOGIC RH(D): CPT

## 2021-01-21 RX ORDER — SODIUM CHLORIDE 9 MG/ML
1000 INJECTION INTRAMUSCULAR; INTRAVENOUS; SUBCUTANEOUS ONCE
Refills: 0 | Status: COMPLETED | OUTPATIENT
Start: 2021-01-21 | End: 2021-01-21

## 2021-01-21 RX ORDER — ONDANSETRON 8 MG/1
4 TABLET, FILM COATED ORAL ONCE
Refills: 0 | Status: COMPLETED | OUTPATIENT
Start: 2021-01-21 | End: 2021-01-21

## 2021-01-21 RX ADMIN — ONDANSETRON 4 MILLIGRAM(S): 8 TABLET, FILM COATED ORAL at 16:30

## 2021-01-21 RX ADMIN — SODIUM CHLORIDE 1000 MILLILITER(S): 9 INJECTION INTRAMUSCULAR; INTRAVENOUS; SUBCUTANEOUS at 16:30

## 2021-01-21 NOTE — ED PROVIDER NOTE - GASTROINTESTINAL, MLM
Abdomen soft, non-tender, no guarding. Abdomen soft, no guarding. +TTP of abd diffusely, particularly lower abd

## 2021-01-21 NOTE — ED PROVIDER NOTE - PATIENT PORTAL LINK FT
You can access the FollowMyHealth Patient Portal offered by [facility] by registering at the following website: [email or web address]. By joining [employer]’s FollowMyHealth portal, you will also be able to view your health information using other applications (apps) compatible with our system.

## 2021-01-21 NOTE — ED PROVIDER NOTE - CARE PROVIDER_API CALL
Rafi Adams)  Surgery; Surgical Critical Care  [address] [place] [address]  Phone: [phone]  Fax: [phone]  Follow Up Time:

## 2021-01-21 NOTE — ED PROVIDER NOTE - NS_ ATTENDINGSCRIBEDETAILS _ED_A_ED_FT
I John Pham MD saw and examined the patient. Scribe documented for me and under my supervision. I have modified the scribe's documentation where necessary to reflect my history, physical exam and other relevant documentations pertinent to the care of the patient.

## 2021-01-21 NOTE — ED PROVIDER NOTE - MUSCULOSKELETAL, MLM
Spine appears normal, range of motion is not limited, no muscle or joint tenderness Spine appears normal, range of motion is not limited, no muscle or joint tenderness. 5/5 strength of upper and lower extremities, no nuchal rigidity

## 2021-01-21 NOTE — ED ADULT TRIAGE NOTE - CHIEF COMPLAINT QUOTE
sent in by dr. anderson for abdominal pain for past 3 days, pt has past medical history of obstruction, c/o similar symptoms from time of obstruction, sent in for further evaluation

## 2021-01-21 NOTE — ED ADULT NURSE NOTE - OBJECTIVE STATEMENT
pt presents to ed ambulatory for evaluation of abdominal pain/ diarrhea/poor po intake and now constipation x 3 days. pt has hx of prior bowel obstruction and reports feeling is the same. pt a&ox4, vitals stable, afebrile, in no distress, ekg done, no other comlaints

## 2021-01-21 NOTE — ED PROVIDER NOTE - PROGRESS NOTE DETAILS
Amy DAVEY for ED attending, Dr. Pham: Updated pt with results of labs and imaging, CT imaging with no acute pathology. Contacted surgery on call. Pt feels ok, provided pt with labs and imaging, pt to follow up with Dr. Adams.

## 2021-01-21 NOTE — ED PROVIDER NOTE - NSFOLLOWUPCLINICS_GEN_ALL_ED_FT
[facility]  Family Medicine  [address] [place] [address]  Phone: [phone]  Fax:   Follow Up Time:

## 2021-01-21 NOTE — ED ADULT NURSE NOTE - NS ED NOTE ABUSE SUSPICION NEGLECT YN
as per patient, she recalls falling but not hitting the ground  ?syncope form orthostasis  patient with continued positive orthostatics after hydration  possibly venlaxifine induced-- will need to address as outpatient if for d/c tomorrow No

## 2021-01-21 NOTE — ED PROVIDER NOTE - NEUROLOGICAL, MLM
Alert and oriented, no focal deficits, no motor or sensory deficits. Alert and oriented, no focal deficits, no motor or sensory deficits. NIH 0, GCS 15, CN 2-12 intact.

## 2021-01-21 NOTE — ED STATDOCS - NS_ ATTENDINGSCRIBEDETAILS _ED_A_ED_FT
I, Braulio Mcfarland MD,  performed the initial face to face bedside interview with this patient regarding history of present illness, review of symptoms and relevant past medical, social and family history.  I completed an independent physical examination.    The history, relevant review of systems, past medical and surgical history, medical decision making, and physical examination was documented by the scribe in my presence and I attest to the accuracy of the documentation.

## 2021-01-21 NOTE — ED PROVIDER NOTE - OBJECTIVE STATEMENT
61 y/o male with PMHx of HTN, bowel obstruction x4 years ago presents to the ED sent in by PMD Dr. Zepeda c/o lower abd pain x3 days. Pt has diarrhea x2 days ago but now with constipation x12 hours, decreased PO intake. Pt states this feels like prior bowel obstruction. Denies vomiting, fevers. Denies chest pain, palpitations, SOB. Occasional weed smoker, non-drinker, non tobacco smoker. 61 y/o male with PMHx of HTN, bowel obstruction x4 years ago repaired by Dr. Adams presents to the ED sent in by PMD Dr. Zepeda c/o lower abd pain x3 days. Pt had diarrhea x2 days ago but now with constipation x12 hours, decreased PO intake. Pt states this feels like prior bowel obstruction. Denies vomiting, fevers. Denies chest pain, palpitations, SOB. Occasional weed smoker, non-drinker, non tobacco smoker.

## 2021-01-21 NOTE — ED PROVIDER NOTE - EYES, MLM
Clear bilaterally, pupils equal, round and reactive to light. Clear bilaterally, pupils equal, round and reactive to light. Visual fields intact.

## 2021-01-21 NOTE — ED PROVIDER NOTE - NSFOLLOWUPINSTRUCTIONS_ED_ALL_ED_FT
Please follow up with Dr. Adams your general surgeron. Please follow up with Dr. Adams your general surgeon. Please note that you had an CAT scan (CT scan) of your abdomen today which as per radiology report did not show any evidence of acute pathology, no obstruction or other emergent findings. Please note that CAT scan (CT scan) imaging is not a perfect imaging and that you might still have pathology that a CAT scan (CT scan) can miss so if you have any worsening of the symptoms return to us immediately. Please contact Dr. Adams or your primary surgeon as soon as possible. Please continue to hydrate. If you have any inability to eat or drink, worsening of the pain, or if you are unable to see Dr. Adams in the next 4 days return to us immediately. If you have worsening of the symptoms please return to us immediately.    ____________      Abdominal Pain, Adult      Pain in the abdomen (abdominal pain) can be caused by many things. Often, abdominal pain is not serious and it gets better with no treatment or by being treated at home. However, sometimes abdominal pain is serious.    Your health care provider will ask questions about your medical history and do a physical exam to try to determine the cause of your abdominal pain.      Follow these instructions at home:     Medicines     •Take over-the-counter and prescription medicines only as told by your health care provider.      • Do not take a laxative unless told by your health care provider.      General instructions     •Watch your condition for any changes.      •Drink enough fluid to keep your urine pale yellow.      •Keep all follow-up visits as told by your health care provider. This is important.        Contact a health care provider if:    •Your abdominal pain changes or gets worse.      •You are not hungry or you lose weight without trying.      •You are constipated or have diarrhea for more than 2–3 days.      •You have pain when you urinate or have a bowel movement.      •Your abdominal pain wakes you up at night.      •Your pain gets worse with meals, after eating, or with certain foods.      •You are vomiting and cannot keep anything down.      •You have a fever.      •You have blood in your urine.        Get help right away if:    •Your pain does not go away as soon as your health care provider told you to expect.      •You cannot stop vomiting.      •Your pain is only in areas of the abdomen, such as the right side or the left lower portion of the abdomen. Pain on the right side could be caused by appendicitis.      •You have bloody or black stools, or stools that look like tar.      •You have severe pain, cramping, or bloating in your abdomen.    •You have signs of dehydration, such as:  •Dark urine, very little urine, or no urine.      •Cracked lips.      •Dry mouth.      •Sunken eyes.      •Sleepiness.      •Weakness.        •You have trouble breathing or chest pain.        Summary    •Often, abdominal pain is not serious and it gets better with no treatment or by being treated at home. However, sometimes abdominal pain is serious.      •Watch your condition for any changes.      •Take over-the-counter and prescription medicines only as told by your health care provider.      •Contact a health care provider if your abdominal pain changes or gets worse.      •Get help right away if you have severe pain, cramping, or bloating in your abdomen.      This information is not intended to replace advice given to you by your health care provider. Make sure you discuss any questions you have with your health care provider.

## 2021-01-21 NOTE — ED PROVIDER NOTE - CLINICAL SUMMARY MEDICAL DECISION MAKING FREE TEXT BOX
Pt with history of SBO, pt of Dr. Adams, presents with CC of worsening abd pain similar to how he felt prior to previous surgery. Plan for CT, labs. Pt with history of SBO (pt of Dr. Adams) presents with CC of worsening abd pain similar to how he felt prior to previous surgery. Plan for CT, labs.

## 2021-02-13 ENCOUNTER — TRANSCRIPTION ENCOUNTER (OUTPATIENT)
Age: 61
End: 2021-02-13

## 2021-10-13 ENCOUNTER — EMERGENCY (EMERGENCY)
Facility: HOSPITAL | Age: 61
LOS: 0 days | Discharge: HOME | End: 2021-10-13
Attending: EMERGENCY MEDICINE
Payer: COMMERCIAL

## 2021-10-13 VITALS — WEIGHT: 175.05 LBS | HEIGHT: 72 IN

## 2021-10-13 VITALS
OXYGEN SATURATION: 99 % | HEART RATE: 60 BPM | TEMPERATURE: 98 F | SYSTOLIC BLOOD PRESSURE: 145 MMHG | DIASTOLIC BLOOD PRESSURE: 85 MMHG | RESPIRATION RATE: 17 BRPM

## 2021-10-13 DIAGNOSIS — I10 ESSENTIAL (PRIMARY) HYPERTENSION: ICD-10-CM

## 2021-10-13 DIAGNOSIS — H57.89 OTHER SPECIFIED DISORDERS OF EYE AND ADNEXA: ICD-10-CM

## 2021-10-13 DIAGNOSIS — Z87.19 PERSONAL HISTORY OF OTHER DISEASES OF THE DIGESTIVE SYSTEM: ICD-10-CM

## 2021-10-13 DIAGNOSIS — H10.33 UNSPECIFIED ACUTE CONJUNCTIVITIS, BILATERAL: ICD-10-CM

## 2021-10-13 DIAGNOSIS — Z88.0 ALLERGY STATUS TO PENICILLIN: ICD-10-CM

## 2021-10-13 PROCEDURE — 99283 EMERGENCY DEPT VISIT LOW MDM: CPT

## 2021-10-13 RX ORDER — OFLOXACIN 0.3 %
1 DROPS OPHTHALMIC (EYE) ONCE
Refills: 0 | Status: COMPLETED | OUTPATIENT
Start: 2021-10-13 | End: 2021-10-13

## 2021-10-13 RX ADMIN — Medication 1 DROP(S): at 23:34

## 2021-10-13 RX ADMIN — Medication 2 DROP(S): at 23:34

## 2021-10-13 NOTE — ED PROVIDER NOTE - PROGRESS NOTE DETAILS
RASHIDA Hutchison MD:  Pt re-examined after topical Tetracaine gtts o.u.  + Symptomatic alleviation of b/L eye pain, pt able to better maintain eyes open, blinking w/o much discomfort.  Pupils B/L well reactive, B/L =.  No focal Flouro uptake.  Pt agreeable with D/C home on topical eye Abx gtts, outpt Optho f/u.

## 2021-10-13 NOTE — ED PROVIDER NOTE - CONSTITUTIONAL, MLM
normal... WM adult, awake, alert, oriented to person, place, time/situation, no respiratory discomfort.  + Acute distress due to B/L eye pain.

## 2021-10-13 NOTE — ED PROVIDER NOTE - ADDITIONAL NOTES AND INSTRUCTIONS:
Stephen Lombardo is medically excused from work 10/14-15/21.  He can return to work Monday, 10/18/21.

## 2021-10-13 NOTE — ED PROVIDER NOTE - OBJECTIVE STATEMENT
62 yo WM, denies PMH, ambulatory to ED c/o'ing B/L eye pain ~ 2 - 3 hours.  + Gradual onset of sharp pain, progressively worsening, + sensitive to light, blinking, + some assoc. blurred vision.  + Associated redness of eye with D/C.  No known injury/trauma nor any known ill contact.  + Employed as teacher.  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  No F/C.  + Mild HA.   + COVID-vaccinated.  All: PCN 62 yo WM, denies PMH, ambulatory to ED c/o'ing B/L eye pain ~ 2 - 3 hours.  + Gradual onset of sharp pain, progressively worsening, + sensitive to light, blinking, + some assoc. blurred vision.  + Associated redness of eye with D/C.  No known injury/trauma.   + Ill contact at home: daughter with pink eye.  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  + Mild HA.   + COVID-vaccinated.  All: PCN 60 yo WM, denies PMH, ambulatory to ED c/o'ing B/L eye pain ~ 2 - 3 hours.  + Gradual onset of sharp pain, progressively worsening, + sensitive to light, blinking; + some assoc. blurred vision.  + Associated redness of eye with D/C.  No known injury/trauma.   + Ill contact at home: daughter with "pink eye".  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  + Mild HA.   + COVID-vaccinated.  All: PCN

## 2021-10-13 NOTE — ED PROVIDER NOTE - EYES, MLM
+ 3+ conjunctival injection B/L with mild conjunctival D/C.  Pupils mid-range, sluggishly reactive & B/L =.  +EOMI.

## 2021-10-13 NOTE — ED ADULT NURSE NOTE - CHIEF COMPLAINT QUOTE
pt presents to the ED c/o irritation/burning to bilateral eyes x 2 hours with +tears/drainage. Pt states he does not remember getting anything in them. States he put lubricating eyedrops in both eyes which made it worse. Denies blurred vision/visual loss. Pt's daughter is being treated for pink eye.

## 2021-10-13 NOTE — ED PROVIDER NOTE - CLINICAL SUMMARY MEDICAL DECISION MAKING FREE TEXT BOX
60 yo WM, denies PMH, ambulatory to ED c/o'ing B/L eye pain ~ 2 - 3 hours.  + Gradual onset of sharp pain, progressively worsening, + sensitive to light, blinking; + some assoc. blurred vision.  + Associated redness of eye with D/C.  No known injury/trauma.   + Ill contact at home: daughter with "pink eye".   Plan: Tetracine gtts o.u with + symptomactic relief, then Flouro test ruled out corneal abrasion, topical Abx gtts for acute conjunctivitis, r/o bacterial.  D/C, outpt optho f/u.

## 2021-10-13 NOTE — ED PROVIDER NOTE - PATIENT PORTAL LINK FT
You can access the FollowMyHealth Patient Portal offered by [facility] by registering at the following website: [email or web address]. By joining [employer]’s FollowMyHealth portal, you will also be able to view your health information using other applications (apps) compatible with our system.

## 2021-10-13 NOTE — ED PROVIDER NOTE - MUSCULOSKELETAL, MLM
Spine appears normal, range of motion is not limited, no muscle or joint tenderness.  Neck: NT, supple w/o pain.

## 2021-10-13 NOTE — ED PROVIDER NOTE - NSFOLLOWUPINSTRUCTIONS_ED_ALL_ED_FT
Take antibiotic eyedrops as prescribed: 1st 2 days: 1 drop each eye every 2 - 3 hours while awake, thereafter 1 drop 4 - 5 x a day for 7 days total duration.  Follow-up with eye doctor.  Motrin/Advil 200 mg 2 - 3 tabs every 6 hours as needed for pain relief.  Wash your hands after touching eyes.  Do NOT share any towels with anyone else.      Conjunctivitis    WHAT YOU NEED TO KNOW:    Conjunctivitis, or pink eye, is inflammation of your conjunctiva. The conjunctiva is a thin tissue that covers the front of your eye and the back of your eyelids. The conjunctiva helps protect your eye and keep it moist. Conjunctivitis may be caused by bacteria, allergies, or a virus. If your conjunctivitis is caused by bacteria, it may get better on its own in about 7 days. Viral conjunctivitis can last up to 3 weeks.     DISCHARGE INSTRUCTIONS:    Return to the emergency department if:   •You have worsening eye pain.       •The swelling in your eye gets worse, even after treatment.       •Your vision suddenly becomes worse or you cannot see at all.      Contact your healthcare provider if:   •You develop a fever and ear pain.      •You have tiny bumps or spots of blood on your eye.      •You have questions or concerns about your condition or care.      Manage your symptoms:   •Apply a cool compress. Wet a washcloth with cold water and place it on your eye. This will help decrease itching and irritation.      •Do not wear contact lenses. They can irritate your eye. Throw away the pair you are using and ask when you can wear them again. Use a new pair of lenses when your healthcare provider says it is okay.       •Avoid irritants. Stay away from smoke filled areas. Shield your eyes from wind and sun.       •Flush your eye. You may need to flush your eye with saline to help decrease your symptoms. Ask for more information on how to flush your eye.       Medicines: Treatment depends on what is causing your conjunctivitis. You maybe given any of the following:  •Allergy medicine helps decrease itchy, red, swollen eyes caused by allergies. It may be given as a pill, eye drops, or nasal spray.      •Antibiotics may be needed if your conjunctivitis is caused by bacteria. This medicine may be given as a pill, eye drops, or eye ointment.      •Take your medicine as directed. Contact your healthcare provider if you think your medicine is not helping or if you have side effects. Tell him or her if you are allergic to any medicine. Keep a list of the medicines, vitamins, and herbs you take. Include the amounts, and when and why you take them. Bring the list or the pill bottles to follow-up visits. Carry your medicine list with you in case of an emergency.      Prevent the spread of conjunctivitis:   •Wash your hands with soap and water often. Wash your hands before and after you touch your eyes. Also wash your hands before you prepare or eat food and after you use the bathroom or change a diaper.      •Avoid allergens. Try to avoid the things that cause your allergies, such as pets, dust, or grass.       •Avoid contact with others. Do not share towels or washcloths. Try to stay away from others as much as possible. Ask when you can return to work or school.       •Throw away eye makeup. The bacteria that caused your conjunctivitis can stay in eye makeup. Throw away mascara and other eye makeup.

## 2021-10-13 NOTE — ED PROVIDER NOTE - CARE PROVIDER_API CALL
Quinton Goldstein (MD; PhD)  Ophthalmology  [address]  [address]  [place] [address]  Phone: [phone]  Fax: [phone]  Follow Up Time:

## 2021-10-14 PROBLEM — K56.609 UNSPECIFIED INTESTINAL OBSTRUCTION, UNSPECIFIED AS TO PARTIAL VERSUS COMPLETE OBSTRUCTION: Chronic | Status: ACTIVE | Noted: 2021-01-29

## 2022-03-08 NOTE — ED ADULT NURSE NOTE - PAIN RATING/NUMBER SCALE (0-10): REST
Received call from transferred center that patient's cardiologist called transfer center for [facility] transfer.  Per transfer center, patient is accepted by Cardiology Dr Lee ( [facility]) 0

## 2022-04-06 ENCOUNTER — INPATIENT (INPATIENT)
Facility: HOSPITAL | Age: 62
LOS: 0 days | Discharge: HOME | DRG: 69 | End: 2022-04-07
Attending: HOSPITALIST | Admitting: FAMILY MEDICINE
Payer: COMMERCIAL

## 2022-04-06 ENCOUNTER — TRANSCRIPTION ENCOUNTER (OUTPATIENT)
Age: 62
End: 2022-04-06

## 2022-04-06 VITALS
HEIGHT: 72 IN | OXYGEN SATURATION: 96 % | WEIGHT: 187.61 LBS | DIASTOLIC BLOOD PRESSURE: 84 MMHG | SYSTOLIC BLOOD PRESSURE: 146 MMHG | HEART RATE: 78 BPM | RESPIRATION RATE: 18 BRPM

## 2022-04-06 DIAGNOSIS — G45.9 TRANSIENT CEREBRAL ISCHEMIC ATTACK, UNSPECIFIED: ICD-10-CM

## 2022-04-06 LAB
ALBUMIN SERPL ELPH-MCNC: 4 G/DL — SIGNIFICANT CHANGE UP (ref 3.3–5)
ALP SERPL-CCNC: 93 U/L — SIGNIFICANT CHANGE UP (ref 40–120)
ALT FLD-CCNC: 35 U/L — SIGNIFICANT CHANGE UP (ref 12–78)
ANION GAP SERPL CALC-SCNC: 6 MMOL/L — SIGNIFICANT CHANGE UP (ref 5–17)
APTT BLD: 35.8 SEC — HIGH (ref 27.5–35.5)
AST SERPL-CCNC: 22 U/L — SIGNIFICANT CHANGE UP (ref 15–37)
BASOPHILS # BLD AUTO: 0.01 K/UL — SIGNIFICANT CHANGE UP (ref 0–0.2)
BASOPHILS NFR BLD AUTO: 0.2 % — SIGNIFICANT CHANGE UP (ref 0–2)
BILIRUB SERPL-MCNC: 0.6 MG/DL — SIGNIFICANT CHANGE UP (ref 0.2–1.2)
BUN SERPL-MCNC: 22 MG/DL — SIGNIFICANT CHANGE UP (ref 7–23)
CALCIUM SERPL-MCNC: 9 MG/DL — SIGNIFICANT CHANGE UP (ref 8.5–10.1)
CHLORIDE SERPL-SCNC: 105 MMOL/L — SIGNIFICANT CHANGE UP (ref 96–108)
CO2 SERPL-SCNC: 29 MMOL/L — SIGNIFICANT CHANGE UP (ref 22–31)
CREAT SERPL-MCNC: 1.16 MG/DL — SIGNIFICANT CHANGE UP (ref 0.5–1.3)
EGFR: 72 ML/MIN/1.73M2 — SIGNIFICANT CHANGE UP
EOSINOPHIL # BLD AUTO: 0.03 K/UL — SIGNIFICANT CHANGE UP (ref 0–0.5)
EOSINOPHIL NFR BLD AUTO: 0.6 % — SIGNIFICANT CHANGE UP (ref 0–6)
GLUCOSE SERPL-MCNC: 115 MG/DL — HIGH (ref 70–99)
HCT VFR BLD CALC: 47.7 % — SIGNIFICANT CHANGE UP (ref 39–50)
HGB BLD-MCNC: 16.2 G/DL — SIGNIFICANT CHANGE UP (ref 13–17)
IMM GRANULOCYTES NFR BLD AUTO: 0.2 % — SIGNIFICANT CHANGE UP (ref 0–1.5)
INR BLD: 1.06 RATIO — SIGNIFICANT CHANGE UP (ref 0.88–1.16)
LYMPHOCYTES # BLD AUTO: 1.31 K/UL — SIGNIFICANT CHANGE UP (ref 1–3.3)
LYMPHOCYTES # BLD AUTO: 24.1 % — SIGNIFICANT CHANGE UP (ref 13–44)
MCHC RBC-ENTMCNC: 31.2 PG — SIGNIFICANT CHANGE UP (ref 27–34)
MCHC RBC-ENTMCNC: 34 GM/DL — SIGNIFICANT CHANGE UP (ref 32–36)
MCV RBC AUTO: 91.9 FL — SIGNIFICANT CHANGE UP (ref 80–100)
MONOCYTES # BLD AUTO: 0.45 K/UL — SIGNIFICANT CHANGE UP (ref 0–0.9)
MONOCYTES NFR BLD AUTO: 8.3 % — SIGNIFICANT CHANGE UP (ref 2–14)
NEUTROPHILS # BLD AUTO: 3.62 K/UL — SIGNIFICANT CHANGE UP (ref 1.8–7.4)
NEUTROPHILS NFR BLD AUTO: 66.6 % — SIGNIFICANT CHANGE UP (ref 43–77)
PLATELET # BLD AUTO: 227 K/UL — SIGNIFICANT CHANGE UP (ref 150–400)
POTASSIUM SERPL-MCNC: 3.5 MMOL/L — SIGNIFICANT CHANGE UP (ref 3.5–5.3)
POTASSIUM SERPL-SCNC: 3.5 MMOL/L — SIGNIFICANT CHANGE UP (ref 3.5–5.3)
PROT SERPL-MCNC: 7.5 GM/DL — SIGNIFICANT CHANGE UP (ref 6–8.3)
PROTHROM AB SERPL-ACNC: 12.3 SEC — SIGNIFICANT CHANGE UP (ref 10.5–13.4)
RBC # BLD: 5.19 M/UL — SIGNIFICANT CHANGE UP (ref 4.2–5.8)
RBC # FLD: 12.1 % — SIGNIFICANT CHANGE UP (ref 10.3–14.5)
SARS-COV-2 RNA SPEC QL NAA+PROBE: SIGNIFICANT CHANGE UP
SODIUM SERPL-SCNC: 140 MMOL/L — SIGNIFICANT CHANGE UP (ref 135–145)
TROPONIN I, HIGH SENSITIVITY RESULT: 6.63 NG/L — SIGNIFICANT CHANGE UP
WBC # BLD: 5.43 K/UL — SIGNIFICANT CHANGE UP (ref 3.8–10.5)
WBC # FLD AUTO: 5.43 K/UL — SIGNIFICANT CHANGE UP (ref 3.8–10.5)

## 2022-04-06 PROCEDURE — 93306 TTE W/DOPPLER COMPLETE: CPT

## 2022-04-06 PROCEDURE — 0042T: CPT

## 2022-04-06 PROCEDURE — 86803 HEPATITIS C AB TEST: CPT

## 2022-04-06 PROCEDURE — 92523 SPEECH SOUND LANG COMPREHEN: CPT | Mod: GN

## 2022-04-06 PROCEDURE — 70498 CT ANGIOGRAPHY NECK: CPT | Mod: 26,MA

## 2022-04-06 PROCEDURE — 92610 EVALUATE SWALLOWING FUNCTION: CPT | Mod: GN

## 2022-04-06 PROCEDURE — 99285 EMERGENCY DEPT VISIT HI MDM: CPT

## 2022-04-06 PROCEDURE — 70496 CT ANGIOGRAPHY HEAD: CPT | Mod: 26,MA

## 2022-04-06 PROCEDURE — 99222 1ST HOSP IP/OBS MODERATE 55: CPT

## 2022-04-06 PROCEDURE — 80061 LIPID PANEL: CPT

## 2022-04-06 PROCEDURE — 82962 GLUCOSE BLOOD TEST: CPT

## 2022-04-06 PROCEDURE — 36415 COLL VENOUS BLD VENIPUNCTURE: CPT

## 2022-04-06 PROCEDURE — 70551 MRI BRAIN STEM W/O DYE: CPT | Mod: 26

## 2022-04-06 PROCEDURE — 97163 PT EVAL HIGH COMPLEX 45 MIN: CPT | Mod: GP

## 2022-04-06 PROCEDURE — 71045 X-RAY EXAM CHEST 1 VIEW: CPT | Mod: 26

## 2022-04-06 PROCEDURE — 83036 HEMOGLOBIN GLYCOSYLATED A1C: CPT

## 2022-04-06 PROCEDURE — 87521 HEPATITIS C PROBE&RVRS TRNSC: CPT

## 2022-04-06 PROCEDURE — 93010 ELECTROCARDIOGRAM REPORT: CPT

## 2022-04-06 PROCEDURE — 80048 BASIC METABOLIC PNL TOTAL CA: CPT

## 2022-04-06 PROCEDURE — G1004: CPT

## 2022-04-06 PROCEDURE — 70551 MRI BRAIN STEM W/O DYE: CPT | Mod: ME

## 2022-04-06 RX ORDER — ARIPIPRAZOLE 15 MG/1
1 TABLET ORAL
Qty: 0 | Refills: 0 | DISCHARGE

## 2022-04-06 RX ORDER — SODIUM CHLORIDE 9 MG/ML
1000 INJECTION, SOLUTION INTRAVENOUS
Refills: 0 | Status: DISCONTINUED | OUTPATIENT
Start: 2022-04-06 | End: 2022-04-07

## 2022-04-06 RX ORDER — METOPROLOL TARTRATE 50 MG
1 TABLET ORAL
Qty: 0 | Refills: 0 | DISCHARGE

## 2022-04-06 RX ORDER — DEXTROSE 50 % IN WATER 50 %
12.5 SYRINGE (ML) INTRAVENOUS ONCE
Refills: 0 | Status: DISCONTINUED | OUTPATIENT
Start: 2022-04-06 | End: 2022-04-07

## 2022-04-06 RX ORDER — AMLODIPINE BESYLATE 2.5 MG/1
5 TABLET ORAL DAILY
Refills: 0 | Status: DISCONTINUED | OUTPATIENT
Start: 2022-04-06 | End: 2022-04-07

## 2022-04-06 RX ORDER — DEXTROSE 50 % IN WATER 50 %
25 SYRINGE (ML) INTRAVENOUS ONCE
Refills: 0 | Status: DISCONTINUED | OUTPATIENT
Start: 2022-04-06 | End: 2022-04-07

## 2022-04-06 RX ORDER — BREXPIPRAZOLE 0.25 MG/1
1 TABLET ORAL
Qty: 0 | Refills: 0 | DISCHARGE

## 2022-04-06 RX ORDER — GLUCAGON INJECTION, SOLUTION 0.5 MG/.1ML
1 INJECTION, SOLUTION SUBCUTANEOUS ONCE
Refills: 0 | Status: DISCONTINUED | OUTPATIENT
Start: 2022-04-06 | End: 2022-04-07

## 2022-04-06 RX ORDER — BREXPIPRAZOLE 0.25 MG/1
1 TABLET ORAL AT BEDTIME
Refills: 0 | Status: DISCONTINUED | OUTPATIENT
Start: 2022-04-06 | End: 2022-04-07

## 2022-04-06 RX ORDER — DEXTROSE 50 % IN WATER 50 %
15 SYRINGE (ML) INTRAVENOUS ONCE
Refills: 0 | Status: DISCONTINUED | OUTPATIENT
Start: 2022-04-06 | End: 2022-04-07

## 2022-04-06 RX ORDER — AMLODIPINE BESYLATE 2.5 MG/1
1 TABLET ORAL
Qty: 0 | Refills: 0 | DISCHARGE

## 2022-04-06 RX ORDER — VORTIOXETINE 5 MG/1
1 TABLET, FILM COATED ORAL
Qty: 0 | Refills: 0 | DISCHARGE

## 2022-04-06 RX ORDER — ATORVASTATIN CALCIUM 80 MG/1
80 TABLET, FILM COATED ORAL AT BEDTIME
Refills: 0 | Status: DISCONTINUED | OUTPATIENT
Start: 2022-04-06 | End: 2022-04-07

## 2022-04-06 RX ORDER — ASPIRIN/CALCIUM CARB/MAGNESIUM 324 MG
81 TABLET ORAL DAILY
Refills: 0 | Status: DISCONTINUED | OUTPATIENT
Start: 2022-04-06 | End: 2022-04-07

## 2022-04-06 RX ORDER — VORTIOXETINE 5 MG/1
10 TABLET, FILM COATED ORAL AT BEDTIME
Refills: 0 | Status: DISCONTINUED | OUTPATIENT
Start: 2022-04-06 | End: 2022-04-07

## 2022-04-06 RX ORDER — INSULIN LISPRO 100/ML
VIAL (ML) SUBCUTANEOUS
Refills: 0 | Status: DISCONTINUED | OUTPATIENT
Start: 2022-04-06 | End: 2022-04-07

## 2022-04-06 RX ORDER — ASPIRIN/CALCIUM CARB/MAGNESIUM 324 MG
325 TABLET ORAL ONCE
Refills: 0 | Status: COMPLETED | OUTPATIENT
Start: 2022-04-06 | End: 2022-04-06

## 2022-04-06 RX ORDER — INSULIN LISPRO 100/ML
VIAL (ML) SUBCUTANEOUS AT BEDTIME
Refills: 0 | Status: DISCONTINUED | OUTPATIENT
Start: 2022-04-06 | End: 2022-04-07

## 2022-04-06 RX ORDER — SERTRALINE 25 MG/1
75 TABLET, FILM COATED ORAL
Qty: 0 | Refills: 0 | DISCHARGE

## 2022-04-06 RX ADMIN — AMLODIPINE BESYLATE 5 MILLIGRAM(S): 2.5 TABLET ORAL at 21:23

## 2022-04-06 RX ADMIN — VORTIOXETINE 10 MILLIGRAM(S): 5 TABLET, FILM COATED ORAL at 21:23

## 2022-04-06 RX ADMIN — ATORVASTATIN CALCIUM 80 MILLIGRAM(S): 80 TABLET, FILM COATED ORAL at 21:23

## 2022-04-06 RX ADMIN — BREXPIPRAZOLE 1 MILLIGRAM(S): 0.25 TABLET ORAL at 21:24

## 2022-04-06 RX ADMIN — Medication 325 MILLIGRAM(S): at 16:14

## 2022-04-06 NOTE — ED PROVIDER NOTE - CLINICAL SUMMARY MEDICAL DECISION MAKING FREE TEXT BOX
Will eval for CVA vs. TIA vs. TGA. Dispo pending neurology consultation. Will eval for CVA vs. TIA vs. TGA. Dispo pending imaging, labs, and neurology consultation.

## 2022-04-06 NOTE — H&P ADULT - NSICDXPASTMEDICALHX_GEN_ALL_CORE_FT
PAST MEDICAL HISTORY:  Bowel obstruction     HTN (hypertension)       Diabetes mellitus      PAST MEDICAL HISTORY:  Bowel obstruction     HTN (hypertension)     Major depression       Diabetes mellitus

## 2022-04-06 NOTE — ED ADULT NURSE NOTE - OBJECTIVE STATEMENT
pt complaining of finding difficulty of words around 11pm last night. Pt presents with headache and dizziness. Nausea and vomiting yesterday so pt could not take BP medication. Pt A&Ox4. Full NIH stroke scale complete. No s/s of distress. pt complaining of finding difficulty of words around 11:30AM. Pt presents with headache and dizziness. Nausea and vomiting yesterday so pt could not take BP medication. Pt A&Ox4. Full NIH stroke scale complete. No s/s of distress.

## 2022-04-06 NOTE — H&P ADULT - NSHPPHYSICALEXAM_GEN_ALL_CORE
Vital Signs Last 24 Hrs  T(C): 36.9 (06 Apr 2022 17:40), Max: 36.9 (06 Apr 2022 17:40)  T(F): 98.4 (06 Apr 2022 17:40), Max: 98.4 (06 Apr 2022 17:40)  HR: 63 (06 Apr 2022 17:40) (63 - 78)  BP: 178/97 (06 Apr 2022 17:40) (146/84 - 178/97)  BP(mean): 103 (06 Apr 2022 16:06) (103 - 103)  RR: 17 (06 Apr 2022 17:40) (16 - 18)  SpO2: 98% (06 Apr 2022 17:40) (96% - 100%)

## 2022-04-06 NOTE — ED PROVIDER NOTE - NS ED ROS FT
Constitutional: nad, well appearing  HEENT:  no nasal congestion, eye drainage or ear pain.  +blurry vision   CVS:  no cp  Resp:  No sob, no cough  GI:  no abdominal pain, no nausea or vomiting  :  no dysuria  MSK: no joint pain or limited ROM  Skin: no rash  Neuro: no change in mental status or level of consciousness. +difficulty speaking.  Heme/lymph: no bleeding

## 2022-04-06 NOTE — ED ADULT TRIAGE NOTE - CHIEF COMPLAINT QUOTE
sudden onset difficulty word finding that started at 11am lasting approx 40min. Now with a headache and dizziness. Reports that he was vomitting yesterday and unable to take his BP medications. PA Charline Alvarado at triage, code stroke activated.

## 2022-04-06 NOTE — ED PROVIDER NOTE - PROGRESS NOTE DETAILS
signed Bertha Alvarado PA-C   Called to triage for stroke eval. pt here with wife, c/o difficulty finding his word and speaking approx 1.5 hours PTA. Pt was with his wife who didn't notice any facial asymmetry and pt denies any other symptoms. LAsted about 20 mins. Pt not on anticoagulation. Pt alert, NAD, NIH 0 at this time. code stroke called. Pt at baseline since arrival here.  Neuro recommends admission.  Further care per inpatient treatment team.

## 2022-04-06 NOTE — H&P ADULT - MOTOR
No pronator drift, Strength 5/5 in all 4 ext, normal bulk and tone, no tremor, rigidity or bradykinesia

## 2022-04-06 NOTE — H&P ADULT - ASSESSMENT
60 y/o M PMHx significant for Hypertension, diabetes mellitus type 2, and bowel obstruction presents to the ED on 4/6/2022 for further evaluation of c/o a transient episode of difficulty with word finding which began around 11 am today. The patient states that today he noticed that he had an acute onset of word finding difficulty. He reports the entire episode lasted about 20-30 minutes before resolving. The patient also reported blurry vision peripherally. The patient then went to a local [facility] for evaluation and was referred to the ED to rule out a stroke. In the ED a code stroke was called. CTH showed no acute infarct or hemorrhage. CTA head and neck showed no LVO, stenosis or aneurysms. CT perfusion showed no core infarct.  IV TPA was not given because all symptoms had resolved, NIHSS 0. Pt was given  mg.    #Acute TIA  ~admit to Telemetry  ~ASA 324mg PO x 1 given in the ED  ~will cont. ASA 81mg po daily  ~f/u MR Brain  ~f/u Lipid profile  ~cont. Atorvastatin 80mg po qhs  ~f/u Hgb A1c within 24 hours  ~maintain systolic bp 170-190mmHg  ~cont. Neuro checks Q4h  ~Vital signs Q4h  ~Blood glucose checks Q6h for 1st 24hrs  ~f/u 2DECHO in the am  ~PT evaluation  ~Dysphagia screen today    #Hypertension  ~cont. Amlodipine 5mg po daily    #Major Depressive Disorder  ~cont. Vortioxetine 10mg po qhs  ~cont. Brexpiprazole 1mg po qhs    #Diabetes Mellitus Type 2  ~diet controlled?  ~FS q6h as above  ~cont. low carb diet if clears dysphagia screen  ~ISS per protocol    #Vte ppx  ~IMPROVE Vte Risk Score is 1  ~cont. SCDs for now

## 2022-04-06 NOTE — H&P ADULT - NEUROLOGICAL DETAILS
alert and oriented x 3/responds to pain/responds to verbal commands/deep reflexes intact/cranial nerves intact

## 2022-04-06 NOTE — STROKE CODE NOTE - NSMDCONSULT QTN_Y FT
Patient is a 61y old  Male who presents with a chief complaint of difficulty with word finding    Time patient arrived to ED: 12:33 pm    HPI: 61 yr old M with PMHx of HTN and depression presented to ED on 4/6/22 with c/o transient episode of difficulty with word finding, now resolved, LKN was around 11 am today. Patient states that today he couldn't get his words out, wife reports that he was using incorrect words as well, the entire episode lasted about 20-30 mins before resolving. Patient did state that the periphery of his vision was blurry as well. He reports no other associated sx. Patient and his wife were concerned and decided to come to ED.    In ED code stroke was called. BP was 173/78. CTH showed no acute infarct or hemorrhage. CTA head and neck showed no LVO, stenosis or aneurysms. CT perfusion showed no core infarct.  IV TPA was not given because all symptoms had resolved, NIHSS 0. Pt was given  mg.    Upon evaluation, patient is awake and alert. Reports he is no longer experiencing difficulty speaking. Denies any current numbness, tingling, headache, visual changes, changes in speech, dizziness or vertigo. He states he had a stomach virus yesterday and was vomiting, didn't take his blood pressure medication, and also did not take his blood pressure medication this morning. Vomiting has resolved today.    PAST MEDICAL & SURGICAL HISTORY:  HTN (hypertension)  Bowel obstruction  No significant past surgical history    FAMILY HISTORY: Noncontributory     Social Hx:  Nonsmoker, no drug or alcohol use    Allergies:  penicillins (Unknown)    Home Medications:  Trintillex  Rexulti  Metoprolol  Valsartan    ROS: Pertinent positives in HPI, all other ROS were reviewed and are negative.      Vital Signs Last 24 Hrs  T(C): --  T(F): --  HR: 78 (06 Apr 2022 13:12) (78 - 78)  BP: 170/78 (06 Apr 2022 13:12) (146/84 - 170/78)  BP(mean): --  RR: 16 (06 Apr 2022 13:12) (16 - 18)  SpO2: 100% (06 Apr 2022 13:12) (96% - 100%)    Physical Exam:  Gen: NAD, normocephalic  HEENT: PERRLA, EOMI  Neck: Supple  Respiratory: Breath sounds are clear bilaterally  Cardiovascular: S1 and S2, regular rhythm  Extremities:  no edema  Vascular: No carotid Bruit  Musculoskeletal: no joint swelling/tenderness, no abnormal movements  Skin: No rashes    Neurological Exam:  HF: A x O x 3, appropriately interactive, normal affect, speech fluent, no aphasia or paraphasic errors. Naming /repetition intact   CN: PERRL, EOMI, VFF, facial sensation normal, no NLFD  Motor: No pronator drift, Strength 5/5 in all 4 ext, normal bulk and tone, no tremor, rigidity or bradykinesia  Sens: Intact to light touch throughout  Reflexes: Symmetric and normal, downgoing toes b/l  Coord:  No FNFA, dysmetria  Gait/Balance: Cannot test    NIHSS- 0    Labs:                        16.2   5.43  )-----------( 227      ( 06 Apr 2022 12:47 )             47.7     Radiology:  CT head/CTA head and neck/CT Perfusion reports:    IMPRESSION:  *  NO EVIDENCE OF AN ACUTE INTRACRANIAL HEMORRHAGE, MIDLINE SHIFT, OR   HYDROCEPHALUS. MINIMAL PERIVENTRICULAR WHITE MATTER MICROVASCULAR   ISCHEMIC CHANGES  *  NO HEMODYNAMIC SIGNIFICANT NARROWING WITHIN THE NECK.  *  NO PROXIMAL LARGE VESSEL OCCLUSION INTRACRANIALLY.  *  NO AREAS OF ISCHEMIA IDENTIFIED ON PERFUSION IMAGING..    A/P: 61 yr old M with PMHx of HTN and depression presented to ED on 4/6/22 with c/o transient episode of difficulty with word finding with blurry vision, now resolved, LKN was around 11 am today, entire episode lasted about 20-30 mins before resolving. In ED code stroke was called. CTH showed no acute infarct or hemorrhage. CTA head and neck showed no LVO, stenosis or aneurysms. CT perfusion showed no core infarct.  IV TPA was not given because all symptoms had resolved, NIHSS 0. Pt was given  mg.    #TIA, resolved within 30 mins    Upon admission:  - MRI head ordered  - ASA 81 mg QD, starting 24 hrs after  was given  - Atorvastatin, lipid profile, Hgb A1c within 24 hours  - Monitor HTN- maintain systolic bp 170-190, no less than 120  - Neuro checks Q4h  - Vital signs Q4h  - Blood glucose checks Q6h for 1st 24hrs  - Echo  - PT eval  - Dysphagia screen today  - DVT prophylaxis  - Telemonitoring    Patient was discussed with Dr. Banegas Patient is a 61y old  Male who presents with a chief complaint of difficulty with word finding    Time patient arrived to ED: 12:33 pm    HPI: 61 yr old M with PMHx of HTN and depression presented to ED on 4/6/22 with c/o transient episode of difficulty with word finding, now resolved, LKN was around 11 am today. Patient states that today he couldn't get his words out, wife reports that he was using incorrect words as well, the entire episode lasted about 20-30 mins before resolving. Patient did state that the periphery of his vision was blurry as well. He reports no other associated sx. Patient and his wife were concerned and decided to come to ED.    In ED code stroke was called. BP was 173/78. CTH showed no acute infarct or hemorrhage. CTA head and neck showed no LVO, stenosis or aneurysms. CT perfusion showed no core infarct.  IV TPA was not given because all symptoms had resolved, NIHSS 0. Pt was given  mg.    Upon evaluation, patient is awake and alert. Reports he is no longer experiencing difficulty speaking. Denies any current numbness, tingling, headache, visual changes, changes in speech, dizziness or vertigo. He states he had a stomach virus yesterday and was vomiting, didn't take his blood pressure medication, and also did not take his blood pressure medication this morning. Vomiting has resolved today.    PAST MEDICAL & SURGICAL HISTORY:  HTN (hypertension)  Bowel obstruction  No significant past surgical history    FAMILY HISTORY: Noncontributory     Social Hx:  Nonsmoker, no drug or alcohol use    Allergies:  penicillins (Unknown)    Home Medications:  Trintillex  Rexulti  Metoprolol  Valsartan    ROS: Pertinent positives in HPI, all other ROS were reviewed and are negative.      Vital Signs Last 24 Hrs  T(C): --  T(F): --  HR: 78 (06 Apr 2022 13:12) (78 - 78)  BP: 170/78 (06 Apr 2022 13:12) (146/84 - 170/78)  BP(mean): --  RR: 16 (06 Apr 2022 13:12) (16 - 18)  SpO2: 100% (06 Apr 2022 13:12) (96% - 100%)    Physical Exam:  Gen: NAD, normocephalic  HEENT: PERRLA, EOMI  Neck: Supple  Respiratory: Breath sounds are clear bilaterally  Cardiovascular: S1 and S2, regular rhythm  Extremities:  no edema  Vascular: No carotid Bruit  Musculoskeletal: no joint swelling/tenderness, no abnormal movements  Skin: No rashes    Neurological Exam:  HF: A x O x 3, appropriately interactive, normal affect, speech fluent, no aphasia or paraphasic errors. Naming /repetition intact   CN: PERRL, EOMI, VFF, facial sensation normal, no NLFD  Motor: No pronator drift, Strength 5/5 in all 4 ext, normal bulk and tone, no tremor, rigidity or bradykinesia  Sens: Intact to light touch throughout  Reflexes: Symmetric and normal, downgoing toes b/l  Coord:  No FNFA, dysmetria  Gait/Balance: Cannot test    NIHSS- 0    Labs:                        16.2   5.43  )-----------( 227      ( 06 Apr 2022 12:47 )             47.7     Radiology:  CT head/CTA head and neck/CT Perfusion reports:    IMPRESSION:  *  NO EVIDENCE OF AN ACUTE INTRACRANIAL HEMORRHAGE, MIDLINE SHIFT, OR   HYDROCEPHALUS. MINIMAL PERIVENTRICULAR WHITE MATTER MICROVASCULAR   ISCHEMIC CHANGES  *  NO HEMODYNAMIC SIGNIFICANT NARROWING WITHIN THE NECK.  *  NO PROXIMAL LARGE VESSEL OCCLUSION INTRACRANIALLY.  *  NO AREAS OF ISCHEMIA IDENTIFIED ON PERFUSION IMAGING..    A/P: 61 yr old M with PMHx of HTN and depression presented to ED on 4/6/22 with c/o transient episode of difficulty with word finding with blurry vision, now resolved, LKN was around 11 am today, entire episode lasted about 20-30 mins before resolving. In ED code stroke was called. CTH showed no acute infarct or hemorrhage. CTA head and neck showed no LVO, stenosis or aneurysms. CT perfusion showed no core infarct.  IV TPA was not given because all symptoms had resolved, NIHSS 0. Pt was given  mg.    #TIA, resolved within 30 mins    Upon admission:  - MRI head ordered  - ASA 81 mg QD, starting 24 hrs after  was given  - Atorvastatin, lipid profile, Hgb A1c within 24 hours  - Monitor HTN- maintain systolic bp 170-190, no less than 120  - Neuro checks Q4h  - Vital signs Q4h  - Blood glucose checks Q6h for 1st 24hrs  - Echo  - PT eval  - Dysphagia screen today  - DVT prophylaxis  - Telemonitoring    Patient was discussed with Dr. Banegas    61 year old man with transient speech arrest, mild headache, exam non focal, NIHSS:0.  CT head, CT angio showed no acute changes LVO. ? TIA  agree with above.

## 2022-04-06 NOTE — PATIENT PROFILE ADULT - FALL HARM RISK - HARM RISK INTERVENTIONS
Assistance with ambulation/Assistance OOB with selected safe patient handling equipment/Communicate Risk of Fall with Harm to all staff/Discuss with provider need for PT consult/Monitor gait and stability/Provide patient with walking aids - walker, cane, crutches/Reinforce activity limits and safety measures with patient and family/Sit up slowly, dangle for a short time, stand at bedside before walking/Tailored Fall Risk Interventions/Visual Cue: Yellow wristband and red socks/Bed in lowest position, wheels locked, appropriate side rails in place/Call bell, personal items and telephone in reach/Instruct patient to call for assistance before getting out of bed or chair/Non-slip footwear when patient is out of bed/[place] to call system/Physically safe environment - no spills, clutter or unnecessary equipment/Purposeful Proactive Rounding/Room/bathroom lighting operational, light cord in reach

## 2022-04-06 NOTE — H&P ADULT - HISTORY OF PRESENT ILLNESS
62 y/o M PMHx significant for Hypertension, diabetes mellitus type 2, and bowel obstruction presents to the ED on 4/6/2022 for further evaluation of c/o a transient episode of difficulty with word finding which began around 11 am today. The patient states that today he noticed that he had an acute onset of word finding difficulty. He reports the entire episode lasted about 20-30 minutes before resolving. The patient also reported blurry vision peripherally. The patient then went to a local [facility] for evaluation and was referred to the ED to rule out a stroke. In the ED a code stroke was called. CTH showed no acute infarct or hemorrhage. CTA head and neck showed no LVO, stenosis or aneurysms. CT perfusion showed no core infarct.  IV TPA was not given because all symptoms had resolved, NIHSS 0. Pt was given  mg.

## 2022-04-06 NOTE — ED PROVIDER NOTE - OBJECTIVE STATEMENT
60 y/o male with a PMHx of HTN, diabetes, bowel obstruction presents to the ED with blurry vision and difficulty finding words, new onset at 11:30 AM today. Pt reports the episode lasted 30 minutes, now resolved. [facility] and was referred to the ED to rule out a stroke. pt denies any weakness, numbness or tingling in the extremities, chest pain, SOB, or LOC.

## 2022-04-07 ENCOUNTER — TRANSCRIPTION ENCOUNTER (OUTPATIENT)
Age: 62
End: 2022-04-07

## 2022-04-07 VITALS
DIASTOLIC BLOOD PRESSURE: 71 MMHG | TEMPERATURE: 98 F | SYSTOLIC BLOOD PRESSURE: 132 MMHG | OXYGEN SATURATION: 96 % | RESPIRATION RATE: 16 BRPM | HEART RATE: 63 BPM

## 2022-04-07 LAB
A1C WITH ESTIMATED AVERAGE GLUCOSE RESULT: 5.5 % — SIGNIFICANT CHANGE UP (ref 4–5.6)
ADD ON TEST-SPECIMEN IN LAB: SIGNIFICANT CHANGE UP
ANION GAP SERPL CALC-SCNC: 5 MMOL/L — SIGNIFICANT CHANGE UP (ref 5–17)
BUN SERPL-MCNC: 16 MG/DL — SIGNIFICANT CHANGE UP (ref 7–23)
CALCIUM SERPL-MCNC: 8.6 MG/DL — SIGNIFICANT CHANGE UP (ref 8.5–10.1)
CHLORIDE SERPL-SCNC: 104 MMOL/L — SIGNIFICANT CHANGE UP (ref 96–108)
CHOLEST SERPL-MCNC: 170 MG/DL — SIGNIFICANT CHANGE UP
CO2 SERPL-SCNC: 29 MMOL/L — SIGNIFICANT CHANGE UP (ref 22–31)
CREAT SERPL-MCNC: 0.91 MG/DL — SIGNIFICANT CHANGE UP (ref 0.5–1.3)
EGFR: 96 ML/MIN/1.73M2 — SIGNIFICANT CHANGE UP
ESTIMATED AVERAGE GLUCOSE: 111 MG/DL — SIGNIFICANT CHANGE UP (ref 68–114)
GLUCOSE SERPL-MCNC: 110 MG/DL — HIGH (ref 70–99)
HCV AB S/CO SERPL IA: 1.22 S/CO — HIGH (ref 0–0.99)
HCV AB SERPL-IMP: ABNORMAL
HDLC SERPL-MCNC: 36 MG/DL — LOW
LIPID PNL WITH DIRECT LDL SERPL: 106 MG/DL — HIGH
NON HDL CHOLESTEROL: 135 MG/DL — HIGH
POTASSIUM SERPL-MCNC: 3.4 MMOL/L — LOW (ref 3.5–5.3)
POTASSIUM SERPL-SCNC: 3.4 MMOL/L — LOW (ref 3.5–5.3)
SODIUM SERPL-SCNC: 138 MMOL/L — SIGNIFICANT CHANGE UP (ref 135–145)
TRIGL SERPL-MCNC: 142 MG/DL — SIGNIFICANT CHANGE UP

## 2022-04-07 PROCEDURE — 93306 TTE W/DOPPLER COMPLETE: CPT | Mod: 26

## 2022-04-07 PROCEDURE — 99232 SBSQ HOSP IP/OBS MODERATE 35: CPT

## 2022-04-07 PROCEDURE — 99239 HOSP IP/OBS DSCHRG MGMT >30: CPT

## 2022-04-07 RX ORDER — CHLORHEXIDINE GLUCONATE 213 G/1000ML
1 SOLUTION TOPICAL
Refills: 0 | Status: DISCONTINUED | OUTPATIENT
Start: 2022-04-07 | End: 2022-04-07

## 2022-04-07 RX ORDER — ASPIRIN/CALCIUM CARB/MAGNESIUM 324 MG
1 TABLET ORAL
Qty: 30 | Refills: 3
Start: 2022-04-07

## 2022-04-07 RX ORDER — ATORVASTATIN CALCIUM 80 MG/1
1 TABLET, FILM COATED ORAL
Qty: 30 | Refills: 3
Start: 2022-04-07

## 2022-04-07 RX ORDER — ACETAMINOPHEN 500 MG
650 TABLET ORAL ONCE
Refills: 0 | Status: COMPLETED | OUTPATIENT
Start: 2022-04-07 | End: 2022-04-07

## 2022-04-07 RX ORDER — POTASSIUM CHLORIDE 20 MEQ
40 PACKET (EA) ORAL EVERY 4 HOURS
Refills: 0 | Status: COMPLETED | OUTPATIENT
Start: 2022-04-07 | End: 2022-04-07

## 2022-04-07 RX ADMIN — Medication 40 MILLIEQUIVALENT(S): at 14:18

## 2022-04-07 RX ADMIN — Medication 40 MILLIEQUIVALENT(S): at 09:24

## 2022-04-07 RX ADMIN — CHLORHEXIDINE GLUCONATE 1 APPLICATION(S): 213 SOLUTION TOPICAL at 12:34

## 2022-04-07 RX ADMIN — Medication 650 MILLIGRAM(S): at 14:19

## 2022-04-07 RX ADMIN — Medication 81 MILLIGRAM(S): at 09:24

## 2022-04-07 RX ADMIN — AMLODIPINE BESYLATE 5 MILLIGRAM(S): 2.5 TABLET ORAL at 09:25

## 2022-04-07 NOTE — DISCHARGE NOTE PROVIDER - HOSPITAL COURSE
61 year old man with HTN presented to [facility] 4/6/22 12:36pm for complaint of word-finding difficulty. Symptom started at approximately 11am. Patient stated that he couldn't get his words out and wife reported that he was using incorrect words as well. Episode lasted about 20-30 mins before resolving. Patient did state that the periphery of his vision was blurry as well. He reported no other associated sx. Denied any numbness, tingling, headache, visual changes, changes in speech, dizziness or vertigo. He did state that he had what he thought was a stomach virus the day prior and was vomiting, didn't take his blood pressure medication, and also did not take his blood pressure medication on the AM of presentation though vomiting had since resolved. In the ED, Code Stroke was called. BP was 173/78. CT head showed no acute infarct or hemorrhage. CTA head and neck showed no large vessel occlusion, stenosis or aneurysms. CT perfusion showed no core infarct.  IV TPA was not given because all symptoms had resolved, NIHSS 0. He was given  mg. Patient was seen by Neurology and found him awake and alert, no longer experiencing difficulty speaking. Admitted to Medicine Tele.    Possible TIA, transient word-finding difficulty  Resolved prior to presentation. 61 year old man with HTN presented to [facility] 4/6/22 12:36pm for complaint of word-finding difficulty. Symptom started at approximately 11am. Patient stated that he couldn't get his words out and wife reported that he was using incorrect words as well. Episode lasted about 20-30 mins before resolving. Patient did state that the periphery of his vision was blurry as well. He reported no other associated sx. Denied any numbness, tingling, headache, visual changes, changes in speech, dizziness or vertigo. He did state that he had what he thought was a stomach virus the day prior and was vomiting, didn't take his blood pressure medication, and also did not take his blood pressure medication on the AM of presentation though vomiting had since resolved. In the ED, Code Stroke was called. BP was 173/78. CT head showed no acute infarct or hemorrhage. CTA head and neck showed no large vessel occlusion, stenosis or aneurysms. CT perfusion showed no core infarct.  IV TPA was not given because all symptoms had resolved, NIHSS 0. He was given  mg. Patient was seen by Neurology and found him awake and alert, no longer experiencing difficulty speaking. Admitted to Medicine Tele.    Possible TIA, transient word-finding difficulty  Resolved prior to presentation. CT head showed no acute infarct or hemorrhage. CTA head and neck showed no large vessel occlusion, stenosis or aneurysms. CT perfusion showed no core infarct.  IV TPA was not given because all symptoms had resolved, NIHSS 0. Remains asymptomatic. Neurology exam normal. MRI brain done, no acute infarct on the diffusion-weighted sequence. No acute hemorrhage. Nonspecific foci of T2 prolongation in the periventricular and subcortical white matter consistent with chronic microvascular ischemic disease. No intracranial mass, extraaxial fluid collection, midline shift, or focal mass effect. Evaluation of the posterior fossa unremarkable. Sella turcica region unremarkable. Normal flow void in the visualized vasculature. Mild mass effect on the brainstem from a tortuous left vertebral artery. Clinical significance is uncertain. Appreciate input from Neurology. Continue Aspirin. Lipitor. Home blood pressure medication regimen. Outpatient follow up with Dr. Christian Barber and Dr. Jose Basurto advised.     HTN  Suboptimally controlled on presentation, now BP WNL. Resume home medication regimen. Follow up with Dr. Christian Barber and Dr. Jose Basurto.     Mild hypokalemia  K 3.4, ordered for potassium supplementation.         Discharge Physical Exam  Afebrile  /71  HR 63  RR 16  O2 96% on RA  Comfortable appearing  NCAT PERRL EOMI  Chest CTA B/L  CVS s1 s2 normal RRR  Abd soft NT ND +BS  Ext No edema or calf tenderness  Skin Warm dry intact  Neuro A+OX3, speech fluent, repetition and naming intact, CN intact, strength 5/5 UEs and LEs, no drift, sensation full, FNF normal, no dysmetria, gait steady  Mood: Calm, pleasant    Labs:                        16.2   5.43 )-----------( 227              47.7       138  |  104  |  16  ----------------------< 110  3.4   |   29   |  0.91    Ca 8.6    TPro  7.5  /  Alb  4.0  /  TBili  0.6  /  DBili  x   /  AST  22  /  ALT  35  /  AlkPhos  93      Troponin negative.  A1c 5.5. Lipid profile in-process.    Micro:  COVID19 PCR 4/6: Negative    Imaging:  CXR: Lungs are clear of airspace consolidations or effusions. No pneumothorax. The heart and mediastinum are normal in configuration. No gross hilar mass. Visualized osseous structures are intact.    CT head: No evidence of intracranial hemorrhage. Minimal chronic periventricular white matter ischemic changes are seen. No mass or mass effect. No hydrocephalus. No midline shift. No extra-axial mass. Basal cisterns   preserved. No depressed calvarial fracture. The visualized paranasal sinuses and mastoid air cells are well aerated. CT perfusion showed no core infarct.      CTA head and neck: No large vessel occlusion, stenosis or aneurysms.      MR brain: No acute infarct on the diffusion-weighted sequence. No acute hemorrhage. Nonspecific foci of T2 prolongation in the periventricular and subcortical white matter consistent with chronic microvascular ischemic disease. No intracranial mass, extraaxial fluid collection, midline shift, or focal mass effect. Evaluation of the posterior fossa unremarkable. Sella turcica region unremarkable. Normal flow void in the visualized vasculature. Mild mass effect on the brainstem from a tortuous left vertebral artery. Clinical significance is uncertain. 61 year old man with HTN presented [facility] 4/6/22 12:36pm for complaint of word-finding difficulty. Symptom started at approximately 11am. Patient stated that he couldn't get his words out and wife reported that he was using incorrect words as well. Episode lasted about 20-30 mins before resolving. Patient did state that the periphery of his vision was blurry as well. He reported no other associated sx. Denied any numbness, tingling, headache, visual changes, changes in speech, dizziness or vertigo. He did state that he had what he thought was a stomach virus the day prior and was vomiting, didn't take his blood pressure medication, and also did not take his blood pressure medication on the AM of presentation though vomiting had since resolved. In the ED, Code Stroke was called. BP was 173/78. CT head showed no acute infarct or hemorrhage. CTA head and neck showed no large vessel occlusion, stenosis or aneurysms. CT perfusion showed no core infarct.  IV TPA was not given because all symptoms had resolved, NIHSS 0. He was given  mg. Patient was seen by Neurology and found him awake and alert, no longer experiencing difficulty speaking. Admitted to Medicine Tele.    Possible TIA, transient word-finding difficulty  Resolved prior to presentation. CT head showed no acute infarct or hemorrhage. CTA head and neck showed no large vessel occlusion, stenosis or aneurysms. CT perfusion showed no core infarct.  IV TPA was not given because all symptoms had resolved, NIHSS 0. Remains asymptomatic. Neurology exam normal. MRI brain done, no acute infarct on the diffusion-weighted sequence. No acute hemorrhage. Nonspecific foci of T2 prolongation in the periventricular and subcortical white matter consistent with chronic microvascular ischemic disease. No intracranial mass, extraaxial fluid collection, midline shift, or focal mass effect. Evaluation of the posterior fossa unremarkable. Sella turcica region unremarkable. Normal flow void in the visualized vasculature. Mild mass effect on the brainstem from a tortuous left vertebral artery. Clinical significance is uncertain. Appreciate input from Neurology. Continue Aspirin. Lipitor. Home blood pressure medication regimen. Outpatient follow up with Dr. Christian Barber and Dr. Jose Basurto advised. Note TTE performed, report still pending.     HTN  Suboptimally controlled on presentation, now BP WNL. Resume home medication regimen. Follow up with Dr. Christian Barber and Dr. Jose Basurto.     Mild hypokalemia  K 3.4, ordered for potassium supplementation.         Discharge Physical Exam  Afebrile  /71  HR 63  RR 16  O2 96% on RA  Comfortable appearing  NCAT PERRL EOMI  Chest CTA B/L  CVS s1 s2 normal RRR  Abd soft NT ND +BS  Ext No edema or calf tenderness  Skin Warm dry intact  Neuro A+OX3, speech fluent, repetition and naming intact, CN intact, strength 5/5 UEs and LEs, no drift, sensation full, FNF normal, no dysmetria, gait steady  Mood: Calm, pleasant    Labs:                        16.2   5.43 )-----------( 227              47.7       138  |  104  |  16  ----------------------< 110  3.4   |   29   |  0.91    Ca 8.6    TPro  7.5  /  Alb  4.0  /  TBili  0.6  /  DBili  x   /  AST  22  /  ALT  35  /  AlkPhos  93      Troponin negative.  A1c 5.5. Lipid profile in-process.    Micro:  COVID19 PCR 4/6: Negative    Imaging:  CXR: Lungs are clear of airspace consolidations or effusions. No pneumothorax. The heart and mediastinum are normal in configuration. No gross hilar mass. Visualized osseous structures are intact.    CT head: No evidence of intracranial hemorrhage. Minimal chronic periventricular white matter ischemic changes are seen. No mass or mass effect. No hydrocephalus. No midline shift. No extra-axial mass. Basal cisterns   preserved. No depressed calvarial fracture. The visualized paranasal sinuses and mastoid air cells are well aerated. CT perfusion showed no core infarct.      CTA head and neck: No large vessel occlusion, stenosis or aneurysms.      MR brain: No acute infarct on the diffusion-weighted sequence. No acute hemorrhage. Nonspecific foci of T2 prolongation in the periventricular and subcortical white matter consistent with chronic microvascular ischemic disease. No intracranial mass, extraaxial fluid collection, midline shift, or focal mass effect. Evaluation of the posterior fossa unremarkable. Sella turcica region unremarkable. Normal flow void in the visualized vasculature. Mild mass effect on the brainstem from a tortuous left vertebral artery. Clinical significance is uncertain.

## 2022-04-07 NOTE — PROGRESS NOTE ADULT - SUBJECTIVE AND OBJECTIVE BOX
Patient has mild HA today, otherwise no complaints.     MRI head was unremarkable.    ROS: As stated above, all others are negative.    Vital Signs Last 24 Hrs  T(C): 36.6 (07 Apr 2022 07:53), Max: 36.9 (06 Apr 2022 17:40)  T(F): 97.8 (07 Apr 2022 07:53), Max: 98.4 (06 Apr 2022 17:40)  HR: 63 (07 Apr 2022 07:53) (63 - 78)  BP: 132/71 (07 Apr 2022 07:53) (132/71 - 178/97)  BP(mean): 103 (06 Apr 2022 16:06) (103 - 103)  RR: 16 (07 Apr 2022 07:53) (16 - 18)  SpO2: 96% (07 Apr 2022 07:53) (96% - 100%)    MEDICATIONS  (STANDING):  amLODIPine   Tablet 5 milliGRAM(s) Oral daily  aspirin enteric coated 81 milliGRAM(s) Oral daily  atorvastatin 80 milliGRAM(s) Oral at bedtime  brexpiprazole 1 milliGRAM(s) Oral at bedtime  chlorhexidine 4% Liquid 1 Application(s) Topical <User Schedule>  dextrose 5%. 1000 milliLiter(s) (100 mL/Hr) IV Continuous <Continuous>  dextrose 5%. 1000 milliLiter(s) (50 mL/Hr) IV Continuous <Continuous>  dextrose 50% Injectable 25 Gram(s) IV Push once  dextrose 50% Injectable 12.5 Gram(s) IV Push once  dextrose 50% Injectable 25 Gram(s) IV Push once  glucagon  Injectable 1 milliGRAM(s) IntraMuscular once  insulin lispro (ADMELOG) corrective regimen sliding scale   SubCutaneous three times a day before meals  insulin lispro (ADMELOG) corrective regimen sliding scale   SubCutaneous at bedtime  potassium chloride    Tablet ER 40 milliEquivalent(s) Oral every 4 hours  vortioxetine 10 milliGRAM(s) Oral at bedtime    MEDICATIONS  (PRN):  dextrose Oral Gel 15 Gram(s) Oral once PRN Blood Glucose LESS THAN 70 milliGRAM(s)/deciliter      PHYSICAL EXAM:  Gen: NAD, normocephalic  HEENT: PERRLA, EOMI  Skin: No rashes    Neurological Exam:  HF: A x O x 3, appropriately interactive, normal affect, speech fluent, no aphasia or paraphasic errors. Naming /repetition intact   CN: PERRL, EOMI, VFF, facial sensation normal, no NLFD  Motor: No pronator drift, Strength 5/5 in all 4 ext, normal bulk and tone, no tremor, rigidity or bradykinesia  Sens: Intact to light touch throughout  Reflexes: Symmetric and normal, downgoing toes b/l  Coord:  No FNFA, dysmetria  Gait/Balance: Cannot test    NIHSS- 0    LABS:                         16.2   5.43  )-----------( 227      ( 06 Apr 2022 12:47 )             47.7     04-07    138  |  104  |  16  ----------------------------<  110<H>  3.4<L>   |  29  |  0.91    Ca    8.6      07 Apr 2022 07:34    TPro  7.5  /  Alb  4.0  /  TBili  0.6  /  DBili  x   /  AST  22  /  ALT  35  /  AlkPhos  93  04-06    LIVER FUNCTIONS - ( 06 Apr 2022 12:47 )  Alb: 4.0 g/dL / Pro: 7.5 gm/dL / ALK PHOS: 93 U/L / ALT: 35 U/L / AST: 22 U/L / GGT: x           RADIOLOGY:  MR Head No Cont (04.06.22 @ 20:35) >  IMPRESSION:    No evidence of acute infarct.    CT head/CTA head and neck/CT Perfusion reports:    IMPRESSION:  *  NO EVIDENCE OF AN ACUTE INTRACRANIAL HEMORRHAGE, MIDLINE SHIFT, OR   HYDROCEPHALUS. MINIMAL PERIVENTRICULAR WHITE MATTER MICROVASCULAR   ISCHEMIC CHANGES  *  NO HEMODYNAMIC SIGNIFICANT NARROWING WITHIN THE NECK.  *  NO PROXIMAL LARGE VESSEL OCCLUSION INTRACRANIALLY.  *  NO AREAS OF ISCHEMIA IDENTIFIED ON PERFUSION IMAGING..

## 2022-04-07 NOTE — DISCHARGE NOTE PROVIDER - CARE PROVIDER_API CALL
KATHY BROWN  Internal Medicine  [address] [place] [address]  Phone: [phone]  Fax: [phone]  Established Patient  Follow Up Time: 1 week    Jose Basurto (DO)  Cardiology  [address] [place] [address]  Phone: [phone]  Fax: [phone]  Established Patient  Follow Up Time: 2 weeks

## 2022-04-07 NOTE — SWALLOW BEDSIDE ASSESSMENT ADULT - SLP GENERAL OBSERVATIONS
The pt is alert and interactive. His receptive language abilities are felt to be relatively preserved. Additionally, pt was able to verbalize during communicative probes via intelligible, fluent, linguistically intact utterances that were produced without evidence of a primary speech-language pathology. Pt able to verbalize needs and feels he is at communicative baseline.

## 2022-04-07 NOTE — DISCHARGE NOTE NURSING/CASE MANAGEMENT/SOCIAL WORK - NSDCPEFALRISK_GEN_ALL_CORE
For information on Fall & Injury Prevention, visit: [email or web address] OR  [email or web address] OR  https://www.cdc.gov/steadi/patient.html

## 2022-04-07 NOTE — DISCHARGE NOTE NURSING/CASE MANAGEMENT/SOCIAL WORK - PATIENT PORTAL LINK FT
You can access the FollowMyHealth Patient Portal offered by [facility] by registering at the following website: [email or web address]. By joining [facility]’s FollowMyHealth portal, you will also be able to view your health information using other applications (apps) compatible with our system.

## 2022-04-07 NOTE — DISCHARGE NOTE PROVIDER - NSDCCPCAREPLAN_GEN_ALL_CORE_FT
PRINCIPAL DISCHARGE DIAGNOSIS  Diagnosis: Transient ischemic attack (TIA)  Assessment and Plan of Treatment: Patient admitted after episode of transient word-finding difficulty that resolved prior to presentation. CT head showed no acute infarct or hemorrhage. CTA angiogram of head and neck showed no large vessel occlusion, stenosis or aneurysms. CT perfusion showed no core infarct.  Given aspirin, statin and admitted to Medicine Tele. Patient has remained asymptomatic. Neurology exam normal. MRI brain done, no acute infarct or hemorrhage. No mass, fluid collection, or shift. Appreciate input from Neurology. Symptom may have been from a TIA. Advise to continued Aspirin, Atorvastatin, home blood pressure medication regimen. Outpatient follow up with Dr. Christian Barber and Dr. Jose Basurto advised. Return to hospital if you develop recurrence of word-finding difficulty or other problems with speech, trouble with vision, trouble swallowing, focal weakness or numbness, intractable headache or other concerning symptoms.

## 2022-04-07 NOTE — PROGRESS NOTE ADULT - ASSESSMENT
61 yr old M with PMHx of HTN and depression presented to ED on 4/6/22 with c/o transient episode of difficulty with word finding with blurry vision, now resolved, LKN was around 11 am today, entire episode lasted about 20-30 mins before resolving. In ED code stroke was called. CTH showed no acute infarct or hemorrhage. CTA head and neck showed no LVO, stenosis or aneurysms. CT perfusion showed no core infarct.  IV TPA was not given because all symptoms had resolved, NIHSS 0. Pt was given  mg.    Impression: Likely TIA, resolved within 30 mins    - Continue ASA 81 mg QD  - Continue Atorvastatin   - F/u on echo  - PT eval  - DVT prophylaxis  - Telemonitoring    Will sign off for now, follow as needed.  Patient was discussed with Dr. Banegas

## 2022-04-07 NOTE — SWALLOW BEDSIDE ASSESSMENT ADULT - SWALLOW EVAL: DIAGNOSIS
1) Pt exhibits functional Oropharyngeal Swallowing abilities for age without behavioral aspiration signs. Odynophagia denied.  2) The pt is alert and interactive. His receptive language abilities are felt to be relatively preserved. Additionally, pt was able to verbalize during communicative probes via intelligible, fluent, linguistically intact utterances that were produced without evidence of a primary speech-language pathology. Pt able to verbalize needs and feels he is at communicative baseline.

## 2022-04-07 NOTE — DISCHARGE NOTE PROVIDER - NSDCMRMEDTOKEN_GEN_ALL_CORE_FT
amLODIPine 5 mg oral tablet: 1 tab(s) orally once a day  brexpiprazole 1 mg oral tablet: 1 tab(s) orally once a day (in the evening)  vortioxetine 10 mg oral tablet: 1 tab(s) orally once a day (in the evening)   amLODIPine 5 mg oral tablet: 1 tab(s) orally once a day  aspirin 81 mg oral delayed release tablet: 1 tab(s) orally once a day  atorvastatin 20 mg oral tablet: 1 tab(s) orally once a day (at bedtime)   brexpiprazole 1 mg oral tablet: 1 tab(s) orally once a day (in the evening)  vortioxetine 10 mg oral tablet: 1 tab(s) orally once a day (in the evening)

## 2022-04-07 NOTE — SWALLOW BEDSIDE ASSESSMENT ADULT - COMMENTS
Pt admitted to  with transient verbal expression difficulties/dysnomia, transient blurry vision and a headache. Neuro w/u in progress. He has an underlying history of HTN, depression and past SBO. See below for additional past medical history.

## 2022-04-07 NOTE — PROGRESS NOTE ADULT - NS ATTEND AMEND GEN_ALL_CORE FT
61 yr old M with PMHx of HTN, presented to ED on 4/6/22 with c/o transient difficulty with word finding with blurry vision, now resolved, MR head showed no acute changes. Agree with above.

## 2022-04-07 NOTE — DISCHARGE NOTE PROVIDER - PROVIDER TOKENS
PROVIDER:[TOKEN:[[id number]],FOLLOWUP:[1 week],ESTABLISHEDPATIENT:[T]],PROVIDER:[TOKEN:[[id number]],FOLLOWUP:[2 weeks],ESTABLISHEDPATIENT:[T]]

## 2022-04-07 NOTE — SWALLOW BEDSIDE ASSESSMENT ADULT - SWALLOW EVAL: CRITERIA FOR SKILLED INTERVENTION MET
DO NOT FEEL THAT ACUTE SPEECH PATHOLOGY FOLLOW UP WOULD CHANGE CLINICAL MANAGEMENT/OUTCOME WHILE IN HOSPITAL. PT'S SPEECH-LANGUAGE ABILITIES AND OROPHARYNGEAL SWALLOWING ABILITIES ARE FUNCTIONAL/AT BASELINE. GIVEN ABOVE, WILL NOT ACTIVELY FOLLOW. RECONSULT PRN SHOULD STATUS CHANGE AND CONDITION WARRANT.

## 2022-04-21 DIAGNOSIS — H53.8 OTHER VISUAL DISTURBANCES: ICD-10-CM

## 2022-04-21 DIAGNOSIS — Z88.0 ALLERGY STATUS TO PENICILLIN: ICD-10-CM

## 2022-04-21 DIAGNOSIS — I10 ESSENTIAL (PRIMARY) HYPERTENSION: ICD-10-CM

## 2022-04-21 DIAGNOSIS — G45.9 TRANSIENT CEREBRAL ISCHEMIC ATTACK, UNSPECIFIED: ICD-10-CM

## 2022-04-21 DIAGNOSIS — E11.9 TYPE 2 DIABETES MELLITUS WITHOUT COMPLICATIONS: ICD-10-CM

## 2022-04-21 DIAGNOSIS — F32.9 MAJOR DEPRESSIVE DISORDER, SINGLE EPISODE, UNSPECIFIED: ICD-10-CM

## 2022-04-21 DIAGNOSIS — E87.6 HYPOKALEMIA: ICD-10-CM

## 2022-04-21 DIAGNOSIS — Z87.19 PERSONAL HISTORY OF OTHER DISEASES OF THE DIGESTIVE SYSTEM: ICD-10-CM

## 2023-02-20 NOTE — ED STATDOCS - PROGRESS NOTE DETAILS
PHYSICIAN ORDERS      DATE & TIME ISSUED: 2023 1:06 PM  PATIENT NAME: Shayne Shoemaker   : 1961     [facility] ED MR# [if applicable]: [id number]     DIAGNOSIS:  Lung Transplant  Z94.2  Please give patient 1L of lactated ringers x2 doses (patinet will get 1L IVF today 2023 and second liter of fluids tomorrow 2023)    Please draw bmp, magnesium, phos, cbc with differential before giving IVF today 2023. Please fax these results to [phone]      Any questions please call: Butch [phone]    Please fax these results to [phone].         Nancy Hirshc MD  Pulmonary Disease        Amy FELICIANO for ED attending, Dr. Mcfarland: 61 y/o M with PMHx of HTN and bowel obstruction presents to the ED sent by Dr. Zepeda c/o +abd pain x3 days. Notes +diarrhea 2 days ago but now with +constipation and +decreased PO intake. Feels like prior bowel obstruction. No vomiting or fever. Allergic to penicillins. Will send pt to main ED for further evaluation.

## 2023-04-20 NOTE — ED ADULT NURSE NOTE - NEURO MENTATION
Malignant neoplasm of prostate ([facility] 10/05/2016    Presence of stent in coronary artery 09/27/2016     Current Outpatient Medications   Medication Sig Dispense Refill    ezetimibe (ZETIA) 10 MG tablet Take 10 mg by mouth daily      febuxostat (ULORIC) 40 MG TABS tablet TAKE ONE TABLET BY MOUTH EVERY DAY      amLODIPine (NORVASC) 10 MG tablet   2    nicotine polacrilex (NICORETTE) 4 MG gum 4 mg      clopidogrel (PLAVIX) 75 MG tablet Take 1 tablet by mouth daily 30 tablet 0    Multiple Vitamins-Minerals (MULTI FOR HIM 50+ PO) Take 1 tablet by mouth daily       levothyroxine (SYNTHROID) 125 MCG tablet Take 137 mcg by mouth Daily       aspirin 325 MG tablet Take 325 mg by mouth daily       atenolol (TENORMIN) 50 MG tablet Take 25 mg by mouth daily Indications: High Blood Pressure       atorvastatin (LIPITOR) 20 MG tablet Take 40 mg by mouth nightly Indications: Changes in Cholesterol       Cholecalciferol (VITAMIN D3) 1000 UNITS TABS Take 1,000 Units by mouth daily       coenzyme Q10 100 MG CAPS capsule Take 100 mg by mouth daily       lisinopril (PRINIVIL;ZESTRIL) 20 MG tablet Take 20 mg by mouth daily       nitroGLYCERIN (NITROSTAT) 0.4 MG SL tablet Place 0.4 mg under the tongue every 5 minutes as needed        No current facility-administered medications for this visit. Allergies: Pollen extract  Past Medical History:   Diagnosis Date    AAA (abdominal aortic aneurysm) without rupture     Arthritis     Atheroscler of native artery of both legs with intermit claudication ([facility]     CAD in native artery     sees dr. Tejas Adams    Carotid artery stenosis     History of blood transfusion     Hyperlipidemia     Hypertension     Hyperthyroidism     Iron deficiency     Malignant neoplasm prostate ([facility]     surgery    Right BBB/left ant fasc block     per dr. Tejas Adams; awaiting ekg from [facility]/office.     Sleep apnea     cpap used    TIA (transient ischemic attack)      Past Surgical History:   Procedure Laterality Date    CAROTID
normal

## 2024-04-09 NOTE — ED STATDOCS - NSTIMEPROVIDERCAREINITIATE_GEN_ER
CC:  Amol Suarez is here today for Office Visit, Establish Care (New Pt ), and Consultation (Ear Infection /Left Ear trouble hearing muffled )    Medications: medications verified, no change  Added preferred pharmacy  Denies  known Latex allergy or symptoms of Latex sensitivity.  All allergies and medications reviewed.  Patient would like communication of their results via:  [employer]      Rooming documentation of social history includes tobacco screening only.   21-Jan-2021 15:32

## 2024-04-25 NOTE — ED ADULT NURSE NOTE - HIV OFFER
Add 52 Modifier (Optional): no Detail Level: Simple Show Applicator Variable?: Yes Medical Necessity Clause: This procedure was medically necessary because the lesions that were treated were: Post-Care Instructions: I reviewed with the patient in detail post-care instructions. Patient is to wear sunprotection, and avoid picking at any of the treated lesions. Pt may apply Vaseline to crusted or scabbing areas. Medical Necessity Information: It is in your best interest to select a reason for this procedure from the list below. All of these items fulfill various CMS LCD requirements except the new and changing color options. Spray Paint Text: The liquid nitrogen was applied to the skin utilizing a spray paint frosting technique. Consent: The patient's consent was obtained including but not limited to risks of crusting, scabbing, blistering, scarring, darker or lighter pigmentary change, recurrence, incomplete removal and infection. Opt out

## 2025-08-19 ENCOUNTER — OFFICE (OUTPATIENT)
Dept: URBAN - METROPOLITAN AREA CLINIC 102 | Facility: CLINIC | Age: 65
Setting detail: OPHTHALMOLOGY
End: 2025-08-19
Payer: COMMERCIAL

## 2025-08-19 DIAGNOSIS — H25.13: ICD-10-CM

## 2025-08-19 DIAGNOSIS — H43.392: ICD-10-CM

## 2025-08-19 DIAGNOSIS — H43.811: ICD-10-CM

## 2025-08-19 PROCEDURE — 92020 GONIOSCOPY: CPT | Performed by: OPHTHALMOLOGY

## 2025-08-19 PROCEDURE — 92004 COMPRE OPH EXAM NEW PT 1/>: CPT | Performed by: OPHTHALMOLOGY

## 2025-08-19 ASSESSMENT — REFRACTION_AUTOREFRACTION
OD_AXIS: 113
OS_AXIS: 045
OD_CYLINDER: -0.75
OS_SPHERE: -0.25
OS_CYLINDER: -0.50
OD_SPHERE: +0.25

## 2025-08-19 ASSESSMENT — KERATOMETRY
OS_K2POWER_DIOPTERS: 43.00
OS_AXISANGLE_DEGREES: 090
OD_AXISANGLE_DEGREES: 005
METHOD_AUTO_MANUAL: AUTO
OS_K1POWER_DIOPTERS: 43.00
OD_K1POWER_DIOPTERS: 42.50
OD_K2POWER_DIOPTERS: 43.50

## 2025-08-19 ASSESSMENT — CONFRONTATIONAL VISUAL FIELD TEST (CVF)
OD_FINDINGS: FULL
OS_FINDINGS: FULL

## 2025-08-19 ASSESSMENT — VISUAL ACUITY
OS_BCVA: 20/20-2
OD_BCVA: 20/20

## 2025-08-19 ASSESSMENT — TONOMETRY
OD_IOP_MMHG: 17
OS_IOP_MMHG: 18